# Patient Record
Sex: MALE | Race: BLACK OR AFRICAN AMERICAN | Employment: UNEMPLOYED | URBAN - METROPOLITAN AREA
[De-identification: names, ages, dates, MRNs, and addresses within clinical notes are randomized per-mention and may not be internally consistent; named-entity substitution may affect disease eponyms.]

---

## 2022-09-13 ENCOUNTER — OFFICE VISIT (OUTPATIENT)
Dept: INTERNAL MEDICINE CLINIC | Age: 57
End: 2022-09-13
Payer: COMMERCIAL

## 2022-09-13 VITALS
SYSTOLIC BLOOD PRESSURE: 182 MMHG | OXYGEN SATURATION: 100 % | TEMPERATURE: 97.9 F | DIASTOLIC BLOOD PRESSURE: 119 MMHG | BODY MASS INDEX: 40.43 KG/M2 | HEART RATE: 95 BPM | RESPIRATION RATE: 17 BRPM | HEIGHT: 74 IN | WEIGHT: 315 LBS

## 2022-09-13 DIAGNOSIS — Z76.89 ENCOUNTER TO ESTABLISH CARE: ICD-10-CM

## 2022-09-13 DIAGNOSIS — R40.0 HAS DAYTIME DROWSINESS: ICD-10-CM

## 2022-09-13 DIAGNOSIS — M54.31 RIGHT SIDED SCIATICA: ICD-10-CM

## 2022-09-13 DIAGNOSIS — Z13.21 SCREENING FOR ENDOCRINE, NUTRITIONAL, METABOLIC AND IMMUNITY DISORDER: ICD-10-CM

## 2022-09-13 DIAGNOSIS — R06.83 SNORING: ICD-10-CM

## 2022-09-13 DIAGNOSIS — Z13.0 SCREENING FOR ENDOCRINE, NUTRITIONAL, METABOLIC AND IMMUNITY DISORDER: ICD-10-CM

## 2022-09-13 DIAGNOSIS — Z23 NEEDS FLU SHOT: ICD-10-CM

## 2022-09-13 DIAGNOSIS — Z13.220 SCREENING FOR LIPID DISORDERS: ICD-10-CM

## 2022-09-13 DIAGNOSIS — Z11.59 NEED FOR HEPATITIS C SCREENING TEST: ICD-10-CM

## 2022-09-13 DIAGNOSIS — E66.01 SEVERE OBESITY (HCC): ICD-10-CM

## 2022-09-13 DIAGNOSIS — J45.40 MODERATE PERSISTENT REACTIVE AIRWAY DISEASE WITHOUT COMPLICATION: ICD-10-CM

## 2022-09-13 DIAGNOSIS — Z11.4 SCREENING FOR HIV WITHOUT PRESENCE OF RISK FACTORS: ICD-10-CM

## 2022-09-13 DIAGNOSIS — Z13.29 SCREENING FOR ENDOCRINE, NUTRITIONAL, METABOLIC AND IMMUNITY DISORDER: ICD-10-CM

## 2022-09-13 DIAGNOSIS — R06.09 DYSPNEA ON EXERTION: ICD-10-CM

## 2022-09-13 DIAGNOSIS — I10 PRIMARY HYPERTENSION: Primary | ICD-10-CM

## 2022-09-13 DIAGNOSIS — Z13.228 SCREENING FOR ENDOCRINE, NUTRITIONAL, METABOLIC AND IMMUNITY DISORDER: ICD-10-CM

## 2022-09-13 PROCEDURE — 99204 OFFICE O/P NEW MOD 45 MIN: CPT | Performed by: NURSE PRACTITIONER

## 2022-09-13 PROCEDURE — 90686 IIV4 VACC NO PRSV 0.5 ML IM: CPT | Performed by: INTERNAL MEDICINE

## 2022-09-13 RX ORDER — AMLODIPINE BESYLATE 10 MG/1
10 TABLET ORAL DAILY
Qty: 90 TABLET | Refills: 3 | Status: SHIPPED | OUTPATIENT
Start: 2022-09-13

## 2022-09-13 RX ORDER — OLMESARTAN MEDOXOMIL 40 MG/1
40 TABLET ORAL DAILY
Qty: 90 TABLET | Refills: 3 | Status: SHIPPED | OUTPATIENT
Start: 2022-09-13

## 2022-09-13 RX ORDER — CHLORTHALIDONE 25 MG/1
25 TABLET ORAL DAILY
Qty: 90 TABLET | Refills: 3 | Status: SHIPPED | OUTPATIENT
Start: 2022-09-13

## 2022-09-13 RX ORDER — POTASSIUM CHLORIDE 20 MEQ/1
20 TABLET, EXTENDED RELEASE ORAL DAILY
Qty: 90 TABLET | Refills: 3 | Status: SHIPPED | OUTPATIENT
Start: 2022-09-13

## 2022-09-13 RX ORDER — HYDROCHLOROTHIAZIDE 25 MG/1
25 TABLET ORAL DAILY
Qty: 30 TABLET | Refills: 11 | Status: CANCELLED | OUTPATIENT
Start: 2022-09-13

## 2022-09-13 RX ORDER — ALBUTEROL SULFATE 90 UG/1
2 AEROSOL, METERED RESPIRATORY (INHALATION)
Qty: 1 EACH | Refills: 1 | Status: SHIPPED | OUTPATIENT
Start: 2022-09-13

## 2022-09-13 NOTE — PATIENT INSTRUCTIONS
Vaccine Information Statement    Influenza (Flu) Vaccine (Inactivated or Recombinant): What You Need to Know    Many vaccine information statements are available in Chinese and other languages. See www.immunize.org/vis. Hojas de información sobre vacunas están disponibles en español y en muchos otros idiomas. Visite www.immunize.org/vis. 1. Why get vaccinated? Influenza vaccine can prevent influenza (flu). Flu is a contagious disease that spreads around the [facility] every year, usually between October and May. Anyone can get the flu, but it is more dangerous for some people. Infants and young children, people 72 years and older, pregnant people, and people with certain health conditions or a weakened immune system are at greatest risk of flu complications. Pneumonia, bronchitis, sinus infections, and ear infections are examples of flu-related complications. If you have a medical condition, such as heart disease, cancer, or diabetes, flu can make it worse. Flu can cause fever and chills, sore throat, muscle aches, fatigue, cough, headache, and runny or stuffy nose. Some people may have vomiting and diarrhea, though this is more common in children than adults. In an average year, thousands of people in the [facility] die from flu, and many more are hospitalized. Flu vaccine prevents millions of illnesses and flu-related visits to the doctor each year. 2. Influenza vaccines     CDC recommends everyone 6 months and older get vaccinated every flu season. Children 6 months through 6years of age may need 2 doses during a single flu season. Everyone else needs only 1 dose each flu season. It takes about 2 weeks for protection to develop after vaccination. There are many flu viruses, and they are always changing. Each year a new flu vaccine is made to protect against the influenza viruses believed to be likely to cause disease in the upcoming flu season.  Even when the vaccine doesnt exactly match these viruses, it may still provide some protection. Influenza vaccine does not cause flu. Influenza vaccine may be given at the same time as other vaccines. 3. Talk with your health care provider    Tell your vaccination provider if the person getting the vaccine:  Has had an allergic reaction after a previous dose of influenza vaccine, or has any severe, life-threatening allergies   Has ever had Guillain-Barré Syndrome (also called GBS)    In some cases, your health care provider may decide to postpone influenza vaccination until a future visit. Influenza vaccine can be administered at any time during pregnancy. People who are or will be pregnant during influenza season should receive inactivated influenza vaccine. People with minor illnesses, such as a cold, may be vaccinated. People who are moderately or severely ill should usually wait until they recover before getting influenza vaccine. Your health care provider can give you more information. 4. Risks of a vaccine reaction    Soreness, redness, and swelling where the shot is given, fever, muscle aches, and headache can happen after influenza vaccination. There may be a very small increased risk of Guillain-Barré Syndrome (GBS) after inactivated influenza vaccine (the flu shot). iLdia Youssef children who get the flu shot along with pneumococcal vaccine (PCV13) and/or DTaP vaccine at the same time might be slightly more likely to have a seizure caused by fever. Tell your health care provider if a child who is getting flu vaccine has ever had a seizure. People sometimes faint after medical procedures, including vaccination. Tell your provider if you feel dizzy or have vision changes or ringing in the ears. As with any medicine, there is a very remote chance of a vaccine causing a severe allergic reaction, other serious injury, or death. 5. What if there is a serious problem?     An allergic reaction could occur after the vaccinated person leaves the clinic. If you see signs of a severe allergic reaction (hives, swelling of the face and throat, difficulty breathing, a fast heartbeat, dizziness, or weakness), call 9-1-1 and get the person to the nearest hospital.    For other signs that concern you, call your health care provider. Adverse reactions should be reported to the Vaccine Adverse Event Reporting System (VAERS). Your health care provider will usually file this report, or you can do it yourself. Visit the VAERS website at www.vaers. [facility].gov or call 1-421.993.3415. VAERS is only for reporting reactions, and VAERS staff members do not give medical advice. 6. The National Vaccine Injury Compensation Program    The [facility] Vaccine Injury Compensation Program (VICP) is a federal program that was created to compensate people who may have been injured by certain vaccines. Claims regarding alleged injury or death due to vaccination have a time limit for filing, which may be as short as two years. Visit the VICP website at www.[facility].gov/vaccinecompensation or call 3-767.476.4475 to learn about the program and about filing a claim. 7. How can I learn more? Ask your health care provider. Call your local or state health department. Visit the website of the Food and Drug Administration (FDA) for vaccine package inserts and additional information at www.fda.gov/vaccines-blood-biologics/vaccines. Contact the Centers for Disease Control and Prevention (CDC): Call 0-821.328.7238 (1-800-CDC-INFO) or  Visit CDCs influenza website at www.cdc.gov/flu. Vaccine Information Statement   Inactivated Influenza Vaccine   8/6/2021  42 TYRONE Ocampo [id number]-[id number]   Department of Health and Human Services  Centers for Disease Control and Prevention    Office Use Only

## 2022-09-13 NOTE — PROGRESS NOTES
Pt is here for   Chief Complaint   Patient presents with    New Patient     Establishing care     Referral Request     For sleep study for possible sleep apnea     Hypertension     Needs medication     Chest Congestion     Pt states that he can feel the congestion in the chest. 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 States that whenever he has a ppd placement, he is always positive. But has never received any additional testing. 1. \"Have you been to the ER, urgent care clinic since your last visit? Hospitalized since your last visit? \" No    2. \"Have you seen or consulted any other health care providers outside of the [address] [place] [address] since your last visit? \" No     3. For patients aged 39-70: Has the patient had a colonoscopy / FIT/ Cologuard? No      If the patient is female:    4. For patients aged 41-77: Has the patient had a mammogram within the past 2 years? NA - based on age or sex      11. For patients aged 21-65: Has the patient had a pap smear? NA - based on age or sex      Mehnaz Avelar is a 65 y.o. male who presents for routine immunizations. He denies any symptoms , reactions or allergies that would exclude them from being immunized today. Risks and adverse reactions were discussed and the VIS was given to them. All questions were addressed. He was observed for 15 min post injection. There were no reactions observed. Maria C Camejo LPN

## 2022-09-13 NOTE — PROGRESS NOTES
Luz Leal (: 1965) is a 65 y.o. male, new patient, here for evaluation of the following chief complaint(s):  New Patient (Establishing care ), Referral Request (For sleep study for possible sleep apnea ), Hypertension (Needs medication ), and Chest Congestion (Pt states that he can feel the congestion in the chest. 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 States that whenever he has a ppd placement, he is always positive. But has never received any additional testing. )       ASSESSMENT/PLAN:  Below is the assessment and plan developed based on review of pertinent history, physical exam, labs, studies, and medications. 1. Primary hypertension  -     amLODIPine (Norvasc) 10 mg tablet; Take 1 Tablet by mouth daily. , Normal, Disp-90 Tablet, R-3  -     chlorthalidone (HYGROTON) 25 mg tablet; Take 1 Tablet by mouth daily. , Normal, Disp-90 Tablet, R-3  -     olmesartan (BENICAR) 40 mg tablet; Take 1 Tablet by mouth daily. , Normal, Disp-90 Tablet, R-3  2. Snoring  -     SLEEP MEDICINE REFERRAL  3. Has daytime drowsiness  -     SLEEP MEDICINE REFERRAL  4. Severe obesity ([address]  -     SLEEP MEDICINE REFERRAL  5. Dyspnea on exertion  -     PULMONARY FUNCTION TEST; Future  -     albuterol (PROVENTIL HFA, VENTOLIN HFA, PROAIR HFA) 90 mcg/actuation inhaler; Take 2 Puffs by inhalation every four (4) hours as needed for Wheezing or Shortness of Breath (persistent coughing). , Normal, Disp-1 Each, R-1  -     XR CHEST PA LAT; Future  6. Screening for lipid disorders  -     LIPID PANEL  7. Need for hepatitis C screening test  -     HCV RNA BY PCR W/REFL GENOTYPE  8. Screening for endocrine, nutritional, metabolic and immunity disorder  -     METABOLIC PANEL, COMPREHENSIVE  -     CBC WITH AUTOMATED DIFF  -     HEMOGLOBIN A1C WITH EAG  -     TSH 3RD GENERATION  9. Screening for HIV without presence of risk factors  -     HIV 1/2 AG/AB, 4TH GENERATION,W RFLX CONFIRM  10.  Moderate persistent reactive airway disease without complication  -     albuterol (PROVENTIL HFA, VENTOLIN HFA, PROAIR HFA) 90 mcg/actuation inhaler; Take 2 Puffs by inhalation every four (4) hours as needed for Wheezing or Shortness of Breath (persistent coughing). , Normal, Disp-1 Each, R-1  -     XR CHEST PA LAT; Future  11. Encounter to establish care  12. Right sided sciatica    Return in about 4 weeks (around 10/11/2022) for OV- HTN med change, lab/PFTs. Pt asked to complete follow by next visit: lose weight, increase physical activity, bring BP log to office visit, follow low salt diet, routine labs ordered, have labs drawn prior to ROV, call if any problems, changed ACEI to ARB, HCTZ changed to chlorthalidone, resumed amlodipine. Held clonidine. Referred to sleep med and PFTs ordered. SUBJECTIVE/OBJECTIVE:  HPI    Pt here to establish care. Seen and treated for HTN in the past. Would like new meds, felt he was on too many in the past. Out for months now. No home BP monitoring. Also reports gaining over 100 lbs with activity change and increased eating since returning home from residential in early 2021. Also has SOB, usually with activity, but also with productive cough and wheezing at times. No h/o smoking or asthma. Lastly with suspected sleep apnea. Associated with loud snoring, daytime drowsiness, and fatigue.      BP Readings from Last 3 Encounters:   09/13/22 (!) 182/119   12/13/13 (!) 150/110   05/03/13 138/76      Review of Systems  Constitutional: negative for fevers, chills, anorexia and weight loss  Respiratory:  negative for cough, hemoptysis, and wheezing  CV:   negative for chest pain, palpitations, and lower extremity edema  GI:   negative for nausea, vomiting, diarrhea, abdominal pain, and melena  Endo:               negative for polyuria,polydipsia,polyphagia, and heat intolerance  Genitourinary: negative for frequency, urgency, dysuria, retention, and hematuria  Integument:  negative for rash, ulcerations, and pruritus  Hematologic:  negative for easy bruising and bleeding  Musculoskel: negative for arthralgias, muscle weakness,and joint pain/swelling  Neurological:  negative for headaches, dizziness, vertigo,and memory/gait problems  Behavl/Psych: negative for feelings of anxiety, depression, suicide, and mood changes    Visit Vitals  BP (!) 182/119 (BP 1 Location: Left upper arm, BP Patient Position: Sitting, BP Cuff Size: Thigh)   Pulse 95   Temp 97.9 °F (36.6 °C) (Temporal)   Resp 17   Ht 6' 2\" (1.88 m)   Wt 344 lb (156 kg)   SpO2 100%   BMI 44.17 kg/m²       Wt Readings from Last 3 Encounters:   09/13/22 344 lb (156 kg)   12/13/13 257 lb 12.8 oz (116.9 kg)   05/03/13 291 lb (132 kg)         Physical Exam:   General appearance - alert, well appearing, and in no distress. Mental status - A/O x 4,normal mood and affect. Chest - CTA. Symmetric chest rise. No wheezing. mild distress. Audible breathing. Heart - Normal rate & rhythm. Normal S1 & S2. No MGR. Abdomen- Soft, round. Non-distended, NT. No pulsatile masses or hernias. Ext-  No clubbing or cyanosis. +BLE, 1+  Skin-Warm and dry. No hyperpigmentation, ulcerations, or suspicious lesions. Neuro - Normal speech, no focal findings or movement disorder. Normal strength, gait, and muscle tone. No results found for this or any previous visit. An electronic signature was used to authenticate this note.   -- Dinorah Miller NP

## 2022-09-14 ENCOUNTER — HOSPITAL ENCOUNTER (OUTPATIENT)
Dept: GENERAL RADIOLOGY | Age: 57
Discharge: HOME | End: 2022-09-14
Payer: COMMERCIAL

## 2022-09-14 ENCOUNTER — TELEPHONE (OUTPATIENT)
Dept: SLEEP MEDICINE | Age: 57
End: 2022-09-14

## 2022-09-14 DIAGNOSIS — J45.40 MODERATE PERSISTENT REACTIVE AIRWAY DISEASE WITHOUT COMPLICATION: ICD-10-CM

## 2022-09-14 DIAGNOSIS — R06.09 DYSPNEA ON EXERTION: ICD-10-CM

## 2022-09-14 PROCEDURE — 71046 X-RAY EXAM CHEST 2 VIEWS: CPT

## 2022-09-15 LAB
ALBUMIN SERPL-MCNC: 4.5 G/DL (ref 3.8–4.9)
ALBUMIN/GLOB SERPL: 1.5 {RATIO} (ref 1.2–2.2)
ALP SERPL-CCNC: 113 IU/L (ref 44–121)
ALT SERPL-CCNC: 9 IU/L (ref 0–44)
AST SERPL-CCNC: 15 IU/L (ref 0–40)
BASOPHILS # BLD AUTO: 0 X10E3/UL (ref 0–0.2)
BASOPHILS NFR BLD AUTO: 0 %
BILIRUB SERPL-MCNC: 0.5 MG/DL (ref 0–1.2)
BUN SERPL-MCNC: 21 MG/DL (ref 6–24)
BUN/CREAT SERPL: 14 (ref 9–20)
CALCIUM SERPL-MCNC: 9.9 MG/DL (ref 8.7–10.2)
CHLORIDE SERPL-SCNC: 98 MMOL/L (ref 96–106)
CHOLEST SERPL-MCNC: 228 MG/DL (ref 100–199)
CO2 SERPL-SCNC: 27 MMOL/L (ref 20–29)
CREAT SERPL-MCNC: 1.48 MG/DL (ref 0.76–1.27)
EGFR: 55 ML/MIN/1.73
EOSINOPHIL # BLD AUTO: 0.1 X10E3/UL (ref 0–0.4)
EOSINOPHIL NFR BLD AUTO: 1 %
ERYTHROCYTE [DISTWIDTH] IN BLOOD BY AUTOMATED COUNT: 15 % (ref 11.6–15.4)
EST. AVERAGE GLUCOSE BLD GHB EST-MCNC: 137 MG/DL
GLOBULIN SER CALC-MCNC: 3 G/DL (ref 1.5–4.5)
GLUCOSE SERPL-MCNC: 98 MG/DL (ref 65–99)
HBA1C MFR BLD: 6.4 % (ref 4.8–5.6)
HCT VFR BLD AUTO: 40 % (ref 37.5–51)
HCV GENTYP SERPL NAA+PROBE: NORMAL
HCV RNA SERPL NAA+PROBE-ACNC: NORMAL IU/ML
HCV RNA SERPL NAA+PROBE-LOG IU: NORMAL LOG10 IU/ML
HDLC SERPL-MCNC: 51 MG/DL
HGB BLD-MCNC: 12.8 G/DL (ref 13–17.7)
HIV 1+2 AB+HIV1 P24 AG SERPL QL IA: NON REACTIVE
IMM GRANULOCYTES # BLD AUTO: 0 X10E3/UL (ref 0–0.1)
IMM GRANULOCYTES NFR BLD AUTO: 0 %
IMP & REVIEW OF LAB RESULTS: NORMAL
INTERPRETATION: NORMAL
LDLC SERPL CALC-MCNC: 162 MG/DL (ref 0–99)
LYMPHOCYTES # BLD AUTO: 1.4 X10E3/UL (ref 0.7–3.1)
LYMPHOCYTES NFR BLD AUTO: 18 %
MCH RBC QN AUTO: 26.1 PG (ref 26.6–33)
MCHC RBC AUTO-ENTMCNC: 32 G/DL (ref 31.5–35.7)
MCV RBC AUTO: 82 FL (ref 79–97)
MONOCYTES # BLD AUTO: 0.8 X10E3/UL (ref 0.1–0.9)
MONOCYTES NFR BLD AUTO: 9 %
NEUTROPHILS # BLD AUTO: 5.7 X10E3/UL (ref 1.4–7)
NEUTROPHILS NFR BLD AUTO: 72 %
PLATELET # BLD AUTO: 266 X10E3/UL (ref 150–450)
POTASSIUM SERPL-SCNC: 3.1 MMOL/L (ref 3.5–5.2)
PROT SERPL-MCNC: 7.5 G/DL (ref 6–8.5)
RBC # BLD AUTO: 4.9 X10E6/UL (ref 4.14–5.8)
SODIUM SERPL-SCNC: 140 MMOL/L (ref 134–144)
TEST INFORMATION: NORMAL
TRIGL SERPL-MCNC: 83 MG/DL (ref 0–149)
TSH SERPL DL<=0.005 MIU/L-ACNC: 4.81 UIU/ML (ref 0.45–4.5)
VLDLC SERPL CALC-MCNC: 15 MG/DL (ref 5–40)
WBC # BLD AUTO: 8 X10E3/UL (ref 3.4–10.8)

## 2022-09-16 DIAGNOSIS — I10 PRIMARY HYPERTENSION: ICD-10-CM

## 2022-09-16 DIAGNOSIS — E78.2 MIXED HYPERLIPIDEMIA: ICD-10-CM

## 2022-09-16 DIAGNOSIS — E66.01 SEVERE OBESITY (HCC): ICD-10-CM

## 2022-09-16 DIAGNOSIS — E88.81 METABOLIC SYNDROME: ICD-10-CM

## 2022-09-16 DIAGNOSIS — R73.03 PREDIABETES: Primary | ICD-10-CM

## 2022-09-16 PROBLEM — E88.810 METABOLIC SYNDROME: Status: ACTIVE | Noted: 2022-09-16

## 2022-09-16 RX ORDER — METFORMIN HYDROCHLORIDE 500 MG/1
500 TABLET ORAL 2 TIMES DAILY WITH MEALS
Qty: 180 TABLET | Refills: 3 | Status: SHIPPED | OUTPATIENT
Start: 2022-09-16

## 2022-09-16 RX ORDER — ATORVASTATIN CALCIUM 20 MG/1
20 TABLET, FILM COATED ORAL
Qty: 90 TABLET | Refills: 3 | Status: SHIPPED | OUTPATIENT
Start: 2022-09-16

## 2022-09-22 ENCOUNTER — HOSPITAL ENCOUNTER (OUTPATIENT)
Dept: PULMONOLOGY | Age: 57
Discharge: HOME | End: 2022-09-22
Attending: NURSE PRACTITIONER
Payer: COMMERCIAL

## 2022-09-22 DIAGNOSIS — R06.09 DYSPNEA ON EXERTION: ICD-10-CM

## 2022-09-22 PROCEDURE — 94010 BREATHING CAPACITY TEST: CPT

## 2022-10-17 ENCOUNTER — VIRTUAL VISIT (OUTPATIENT)
Dept: INTERNAL MEDICINE CLINIC | Age: 57
End: 2022-10-17
Payer: COMMERCIAL

## 2022-10-17 DIAGNOSIS — I10 PRIMARY HYPERTENSION: ICD-10-CM

## 2022-10-17 DIAGNOSIS — D50.8 IRON DEFICIENCY ANEMIA SECONDARY TO INADEQUATE DIETARY IRON INTAKE: Primary | ICD-10-CM

## 2022-10-17 DIAGNOSIS — E87.6 DIURETIC-INDUCED HYPOKALEMIA: ICD-10-CM

## 2022-10-17 DIAGNOSIS — R79.89 ELEVATED TSH: ICD-10-CM

## 2022-10-17 DIAGNOSIS — T50.2X5A DIURETIC-INDUCED HYPOKALEMIA: ICD-10-CM

## 2022-10-17 DIAGNOSIS — E66.01 SEVERE OBESITY (HCC): ICD-10-CM

## 2022-10-17 DIAGNOSIS — E78.2 MIXED HYPERLIPIDEMIA: ICD-10-CM

## 2022-10-17 DIAGNOSIS — E88.81 METABOLIC SYNDROME: ICD-10-CM

## 2022-10-17 DIAGNOSIS — N28.9 RENAL IMPAIRMENT: ICD-10-CM

## 2022-10-17 DIAGNOSIS — R73.03 PREDIABETES: ICD-10-CM

## 2022-10-17 PROCEDURE — 99214 OFFICE O/P EST MOD 30 MIN: CPT | Performed by: NURSE PRACTITIONER

## 2022-10-17 NOTE — PROGRESS NOTES
Vita Leal is a 65 y.o. male established patient, here for evaluation of the following chief complaint(s):   Follow-up (HTN med change, labs/PFTs)          Assessment & Plan:   Diagnoses and all orders for this visit:    1. Iron deficiency anemia secondary to inadequate dietary iron intake  -     HGB & HCT; Future    2. Renal impairment  -     RENAL FUNCTION PANEL    3. Elevated TSH  -     TSH 3RD GENERATION; Future  -     T4 (THYROXINE); Future    4. Severe obesity ([address]  -     RENAL FUNCTION PANEL  -     HGB & HCT; Future  -     TSH 3RD GENERATION; Future  -     T4 (THYROXINE); Future    5. Mixed hyperlipidemia    6. Metabolic syndrome    7. Prediabetes    8. Primary hypertension    9. Diuretic-induced hypokalemia              Follow-up and Dispositions    Return in about 2 months (around 12/17/2022) for OV- Repeat labs A1C for preDM, CHOL, BMI, HTN. Specific pt instructions until next visit: lose weight, increase physical activity, follow low salt diet, continue present plan, routine labs ordered, have labs drawn prior to ROV, call if any problems    Subjective:   Vita Leal is a 65 y.o. male who was seen for Follow-up (HTN med change, labs/PFTs)      Pt presents to f/u HTN and review labs. Has already reviewed labs online/letter, has no questions regarding the results or recommendations. Home BP readings running 120/80's. Taking meds as prescribed. Denies side effects from medication. Feels good. No acute complaints otherwise.   BP Readings from Last 3 Encounters:   09/13/22 (!) 182/119   12/13/13 (!) 150/110   05/03/13 138/76     Lab Results   Component Value Date/Time    Cholesterol, total 228 (H) 09/14/2022 08:49 AM    HDL Cholesterol 51 09/14/2022 08:49 AM    LDL, calculated 162 (H) 09/14/2022 08:49 AM    VLDL, calculated 15 09/14/2022 08:49 AM    Triglyceride 83 09/14/2022 08:49 AM     Lab Results   Component Value Date/Time    Hemoglobin A1c 6.4 (H) 09/14/2022 08:49 AM     Lab Results Component Value Date/Time    TSH 4.810 (H) 09/14/2022 08:49 AM     Lab Results   Component Value Date/Time    Sodium 140 09/14/2022 08:49 AM    Potassium 3.1 (L) 09/14/2022 08:49 AM    Chloride 98 09/14/2022 08:49 AM    CO2 27 09/14/2022 08:49 AM    Glucose 98 09/14/2022 08:49 AM    BUN 21 09/14/2022 08:49 AM    Creatinine 1.48 (H) 09/14/2022 08:49 AM    BUN/Creatinine ratio 14 09/14/2022 08:49 AM    Calcium 9.9 09/14/2022 08:49 AM    Bilirubin, total 0.5 09/14/2022 08:49 AM    Alk. phosphatase 113 09/14/2022 08:49 AM    Protein, total 7.5 09/14/2022 08:49 AM    Albumin 4.5 09/14/2022 08:49 AM    A-G Ratio 1.5 09/14/2022 08:49 AM    ALT (SGPT) 9 09/14/2022 08:49 AM    AST (SGOT) 15 09/14/2022 08:49 AM     Lab Results   Component Value Date/Time    WBC 8.0 09/14/2022 08:49 AM    HGB 12.8 (L) 09/14/2022 08:49 AM    HCT 40.0 09/14/2022 08:49 AM    PLATELET 133 70/54/6109 08:49 AM    MCV 82 09/14/2022 08:49 AM           Patient Active Problem List    Diagnosis Date Noted    Diuretic-induced hypokalemia 10/17/2022    Elevated TSH 10/17/2022    Renal impairment 10/17/2022    Prediabetes 07/79/1522    Metabolic syndrome 85/76/5029    Mixed hyperlipidemia 09/16/2022    Right sided sciatica 09/13/2022    Dyspnea on exertion 09/13/2022    Severe obesity ([address] 09/13/2022    Snoring 09/13/2022    Moderate persistent reactive airway disease without complication 91/30/2083    HTN (hypertension) 03/11/2013     Current Outpatient Medications   Medication Sig Dispense Refill    metFORMIN (GLUCOPHAGE) 500 mg tablet Take 1 Tablet by mouth two (2) times daily (with meals). Indications: type 2 diabetes mellitus 180 Tablet 3    atorvastatin (LIPITOR) 20 mg tablet Take 1 Tablet by mouth nightly. Indications: high cholesterol and high triglycerides 90 Tablet 3    amLODIPine (Norvasc) 10 mg tablet Take 1 Tablet by mouth daily. 90 Tablet 3    chlorthalidone (HYGROTON) 25 mg tablet Take 1 Tablet by mouth daily.  90 Tablet 3    olmesartan (BENICAR) 40 mg tablet Take 1 Tablet by mouth daily. 90 Tablet 3    albuterol (PROVENTIL HFA, VENTOLIN HFA, PROAIR HFA) 90 mcg/actuation inhaler Take 2 Puffs by inhalation every four (4) hours as needed for Wheezing or Shortness of Breath (persistent coughing). 1 Each 1    potassium chloride (K-DUR, KLOR-CON M20) 20 mEq tablet Take 1 Tablet by mouth daily. With diuretic (Hydrochlorthiazide or Lasix)  Indications: prevention of low potassium in the blood 90 Tablet 3     No Known Allergies  Past Medical History:   Diagnosis Date    HTN (hypertension)     Hypokalemia      Past Surgical History:   Procedure Laterality Date    HX PREMALIG/BENIGN SKIN LESION EXCISION      cyst removal on scalp       Review of Systems   Constitutional: Negative for fever and malaise/fatigue. Eyes: Negative for blurred vision. Respiratory: Negative for cough and shortness of breath. Cardiovascular: Negative for chest pain and leg swelling. Neurological: Negative for dizziness, weakness and headaches. Objective:   Vital Signs: (As obtained by patient/caregiver at home)  There were no vitals taken for this visit. Physical Exam:  General appearance - alert, well  appearing, and in no distress. Mental status - A/O x 4,normal mood and affect. Eyes- no periorbital edema, drainage, or irritation noted. Nose- no obvious drainage or swelling. Throat- no obvious swelling, goiter, or notable lymphadenopathy  Chest - Symmetric chest rise. No wheezing or coughing. No distress. Skin- normal skin tone noted. No hyperpigmentation or obvious deformities. No diaphoresis noted. No flushing. Neuro - Normal speech, no focal findings or movement disorder. Other pertinent observable physical exam findings:-        We discussed the expected course, resolution and complications of the diagnosis(es) in detail. Medication risks, benefits, costs, interactions, and alternatives were discussed as indicated.   I advised him to contact the office if his condition worsens, changes or fails to improve as anticipated. He expressed understanding with the diagnosis(es) and plan. Khurram Leal, was evaluated through a synchronous (real-time) audio-video encounter. The patient (or guardian if applicable) is aware that this is a billable service, which includes applicable co-pays. This Virtual Visit was conducted with patient's (and/or legal guardian's) consent. The visit was conducted pursuant to the emergency declaration under the [address] [place] [address], [address] [place] [address] authority and the [employer] General Act. Patient identification was verified, and a caregiver was present when appropriate. The patient was located at: Home: [address] [facility]  The provider was located at: Home: [unfilled]   in [facility], [address] [place] [address] [employer] was used to authenticate this note.   -- Jacques Russo NP

## 2022-10-17 NOTE — PROGRESS NOTES
Pt is here for   Chief Complaint   Patient presents with    Follow-up     HTN med change, labs/PFTs     1. Have you been to the ER, urgent care clinic since your last visit? Hospitalized since your last visit? No    2. Have you seen or consulted any other health care providers outside of the [address] [place] [address] since your last visit? Include any pap smears or colon screening.  No

## 2022-10-27 ENCOUNTER — DOCUMENTATION ONLY (OUTPATIENT)
Dept: SLEEP MEDICINE | Age: 57
End: 2022-10-27

## 2022-10-27 ENCOUNTER — OFFICE VISIT (OUTPATIENT)
Dept: SLEEP MEDICINE | Age: 57
End: 2022-10-27
Payer: COMMERCIAL

## 2022-10-27 VITALS
DIASTOLIC BLOOD PRESSURE: 108 MMHG | OXYGEN SATURATION: 97 % | HEART RATE: 90 BPM | WEIGHT: 315 LBS | SYSTOLIC BLOOD PRESSURE: 162 MMHG | HEIGHT: 74 IN | BODY MASS INDEX: 40.43 KG/M2

## 2022-10-27 DIAGNOSIS — E66.01 SEVERE OBESITY (BMI >= 40) (HCC): ICD-10-CM

## 2022-10-27 DIAGNOSIS — I10 PRIMARY HYPERTENSION: ICD-10-CM

## 2022-10-27 DIAGNOSIS — G47.33 OBSTRUCTIVE SLEEP APNEA (ADULT) (PEDIATRIC): Primary | ICD-10-CM

## 2022-10-27 PROCEDURE — 3075F SYST BP GE 130 - 139MM HG: CPT | Performed by: INTERNAL MEDICINE

## 2022-10-27 PROCEDURE — 3078F DIAST BP <80 MM HG: CPT | Performed by: INTERNAL MEDICINE

## 2022-10-27 PROCEDURE — 99204 OFFICE O/P NEW MOD 45 MIN: CPT | Performed by: INTERNAL MEDICINE

## 2022-10-27 NOTE — PATIENT INSTRUCTIONS
[address] [facility] [place], [address]  Tel.  [phone]  Fax. [facility]  [place], [address] [facility]  Tel.  [phone]  Fax. [phone] [address]  Tel.  [phone]  Fax. [phone]     Sleep Apnea: After Your Visit  Your Care Instructions  Sleep apnea occurs when you frequently stop breathing for 10 seconds or longer during sleep. It can be mild to severe, based on the number of times per hour that you stop breathing or have slowed breathing. Blocked or narrowed airways in your nose, mouth, or throat can cause sleep apnea. Your airway can become blocked when your throat muscles and tongue relax during sleep. Sleep apnea is common, occurring in 1 out of 20 individuals. Individuals having any of the following characteristics should be evaluated and treated right away due to high risk and detrimental consequences from untreated sleep apnea:  Obesity  Congestive Heart failure  Atrial Fibrillation  Uncontrolled Hypertension  Type II Diabetes  Night-time Arrhythmias  Stroke  Pulmonary Hypertension  High-risk Driving Populations (pilots, truck drivers, etc.)  Patients Considering Weight-loss Surgery    How do you know you have sleep apnea? You probably have sleep apnea if you answer 'yes' to 3 or more of the following questions:  S - Have you been told that you Snore? T - Are you often Tired during the day? O - Has anyone Observed you stop breathing while sleeping? P- Do you have (or are being treated for) high blood Pressure? B - Are you obese (Body Mass Index > 35)? A - Is your Age 48years old or older? N - Is your Neck size greater than 16 inches? G - Are you male Gender? A sleep physician can prescribe a breathing device that prevents tissues in the throat from blocking your airway. Or your doctor may recommend using a dental device (oral breathing device) to help keep your airway open.  In some cases, surgery may be needed to remove enlarged tissues in the throat. Follow-up care is a key part of your treatment and safety. Be sure to make and go to all appointments, and call your doctor if you are having problems. It's also a good idea to know your test results and keep a list of the medicines you take. How can you care for yourself at home? Lose weight, if needed. It may reduce the number of times you stop breathing or have slowed breathing. Go to bed at the same time every night. Sleep on your side. It may stop mild apnea. If you tend to roll onto your back, sew a pocket in the back of your pa[employer] top. Put a tennis ball into the pocket, and stitch the pocket shut. This will help keep you from sleeping on your back. Avoid alcohol and medicines such as sleeping pills and sedatives before bed. Do not smoke. Smoking can make sleep apnea worse. If you need help quitting, talk to your doctor about stop-smoking programs and medicines. These can increase your chances of quitting for good. Prop up the head of your bed 4 to 6 inches by putting bricks under the legs of the bed. Treat breathing problems, such as a stuffy nose, caused by a cold or allergies. Use a continuous positive airway pressure (CPAP) breathing machine if lifestyle changes do not help your apnea and your doctor recommends it. The machine keeps your airway from closing when you sleep. If CPAP does not help you, ask your doctor whether you should try other breathing machines. A bilevel positive airway pressure machine has two types of air pressureâone for breathing in and one for breathing out. Another device raises or lowers air pressure as needed while you breathe. If your nose feels dry or bleeds when using one of these machines, talk with your doctor about increasing moisture in the air. A humidifier may help.   If your nose is runny or stuffy from using a breathing machine, talk with your doctor about using decongestants or a corticosteroid nasal spray.  When should you call for help? Watch closely for changes in your health, and be sure to contact your doctor if:  You still have sleep apnea even though you have made lifestyle changes. You are thinking of trying a device such as CPAP. You are having problems using a CPAP or similar machine. Where can you learn more? Go to [employer]. Enter W528 in the search box to learn more about \"Sleep Apnea: After Your Visit. \"   © 3466-7122 Healthwise, Incorporated. Care instructions adapted under license by [employer] (which disclaims liability or warranty for this information). This care instruction is for use with your licensed healthcare professional. If you have questions about a medical condition or this instruction, always ask your healthcare professional. Karen Sours any warranty or liability for your use of this information. PROPER SLEEP HYGIENE    What to avoid  Do not have drinks with caffeine, such as coffee or black tea, for 8 hours before bed. Do not smoke or use other types of tobacco near bedtime. Nicotine is a stimulant and can keep you awake. Avoid drinking alcohol late in the evening, because it can cause you to wake in the middle of the night. Do not eat a big meal close to bedtime. If you are hungry, eat a light snack. Do not drink a lot of water close to bedtime, because the need to urinate may wake you up during the night. Do not read or watch TV in bed. Use the bed only for sleeping and sexual activity. What to try  Go to bed at the same time every night, and wake up at the same time every morning. Do not take naps during the day. Keep your bedroom quiet, dark, and cool. Get regular exercise, but not within 3 to 4 hours of your bedtime. .  Sleep on a comfortable pillow and mattress. If watching the clock makes you anxious, turn it facing away from you so you cannot see the time.   If you worry when you lie down, start a worry book. Well before bedtime, write down your worries, and then set the book and your concerns aside. Try meditation or other relaxation techniques before you go to bed. If you cannot fall asleep, get up and go to another room until you feel sleepy. Do something relaxing. Repeat your bedtime routine before you go to bed again. Make your house quiet and calm about an hour before bedtime. Turn down the lights, turn off the TV, log off the computer, and turn down the volume on music. This can help you relax after a busy day. Drowsy Driving  The [address] [place] [address] Road Traffic Safety Administration cites drowsiness as a causing factor in more than 295,068 police reported crashes annually, resulting in 76,000 injuries and 1,500 deaths. Other surveys suggest 55% of people polled have driven while drowsy in the past year, 23% had fallen asleep but not crashed, 3% crashed, and 2% had and accident due to drowsy driving. Who is at risk? Young Drivers: One study of drowsy driving accidents states that 55% of the drivers were under 25 years. Of those, 75% were male. Shift Workers and Travelers: People who work overnight or travel across time zones frequently are at higher risk of experiencing Circadian Rhythm Disorders. They are trying to work and function when their body is programed to sleep. Sleep Deprived: Lack of sleep has a serious impact on your ability to pay attention or focus on a task. Consistently getting less than the average of 8 hours your body needs creates partial or cumulative sleep deprivation. Untreated Sleep Disorders: Sleep Apnea, Narcolepsy, R.L.S., and other sleep disorders (untreated) prevent a person from getting enough restful sleep. This leads to excessive daytime sleepiness and increases the risk for drowsy driving accidents by up to 7 times. Medications / Alcohol: Even over the counter medications can cause drowsiness.  Medications that impair a drivers attention should have a warning label. Alcohol naturally makes you sleepy and on its own can cause accidents. Combined with excessive drowsiness its effects are amplified. Signs of Drowsy Driving:   * You don't remember driving the last few miles   * You may drift out of your jasbir   * You are unable to focus and your thoughts wander   * You may yawn more often than normal   * You have difficulty keeping your eyes open / nodding off   * Missing traffic signs, speeding, or tailgating  Prevention-   Good sleep hygiene, lifestyle and behavioral choices have the most impact on drowsy driving. There is no substitute for sleep and the average person requires 8 hours nightly. If you find yourself driving drowsy, stop and sleep. Consider the sleep hygiene tips provided during your visit as well. Medication Refill Policy: Refills for all medications require 1 week advance notice. Please have your pharmacy fax a refill request. We are unable to fax, or call in \"controled substance\" medications and you will need to pick these prescriptions up from our office. [employer] Activation    Thank you for requesting access to [employer]. Please follow the instructions below to securely access and download your online medical record. [employer] allows you to send messages to your doctor, view your test results, renew your prescriptions, schedule appointments, and more. How Do I Sign Up? In your internet browser, go to [email or web address]. Click on the First Time User? Click Here link in the Sign In box. You will see the New Member Sign Up page. Enter your [employer] Access Code exactly as it appears below. You will not need to use this code after youve completed the sign-up process. If you do not sign up before the expiration date, you must request a new code.     [employer] Access Code: [email or web address]  Expires: 10/28/2022  1:52 PM (This is the date your [employer] access code will )    Enter the last four digits of your Social Security Number (xxxx) and Date of Birth (mm/dd/yyyy) as indicated and click Submit. You will be taken to the next sign-up page. Create a [employer] ID. This will be your [employer] login ID and cannot be changed, so think of one that is secure and easy to remember. Create a [employer] password. You can change your password at any time. Enter your Password Reset Question and Answer. This can be used at a later time if you forget your password. Enter your e-mail address. You will receive e-mail notification when new information is available in [address] Click Sign Up. You can now view and download portions of your medical record. Click the [facility] link to download a portable copy of your medical information. Additional Information    If you have questions, please call [phone]. Remember, [employer] is NOT to be used for urgent needs. For medical emergencies, dial 911.

## 2022-10-27 NOTE — PROGRESS NOTES
[address] [place]. [place], [address]  Tel.  [phone]  Fax. 100 [facility]  [place], [address] [facility]  Tel.  [phone]  Fax. [phone] [address]   Tel.  [phone]  Fax. [phone]         Subjective:      Brett Leal is an 65 y.o. male referred for evaluation for a sleep disorder. He complains of snoring, periods of not breathing associated with excessive daytime sleepiness. Symptoms began over 15  years ago, gradually worsening since that time. He usually can fall asleep in a few minutes. Family or house members note snoring, periods of not breathing. He denies falling asleep while driving. Brett Leal does wake up frequently at night. He is bothered by waking up too early and left unable to get back to sleep. He actually sleeps about 4 hours at night and wakes up about 6 times during the night. He does not work shifts:  .   Sheryle Lorenzo indicates he does get too little sleep at night. His bedtime is 0500. He awakens at 1000. He does take naps. He takes   naps a week lasting 1, Hour(s). He has the following observed behaviors: Loud snoring, Light snoring, Pauses in breathing;  . Other remarks: waking with gasp or snort, hallucinations   (Thinks he hears something when waking at times-like someone at the door)  [place] Sleepiness Score: 14   which reflect moderate daytime drowsiness. No Known Allergies      Current Outpatient Medications:     metFORMIN (GLUCOPHAGE) 500 mg tablet, Take 1 Tablet by mouth two (2) times daily (with meals). Indications: type 2 diabetes mellitus, Disp: 180 Tablet, Rfl: 3    atorvastatin (LIPITOR) 20 mg tablet, Take 1 Tablet by mouth nightly. Indications: high cholesterol and high triglycerides, Disp: 90 Tablet, Rfl: 3    amLODIPine (Norvasc) 10 mg tablet, Take 1 Tablet by mouth daily. , Disp: 90 Tablet, Rfl: 3    chlorthalidone (HYGROTON) 25 mg tablet, Take 1 Tablet by mouth daily. , Disp: 90 Tablet, Rfl: 3    olmesartan (BENICAR) 40 mg tablet, Take 1 Tablet by mouth daily. , Disp: 90 Tablet, Rfl: 3    albuterol (PROVENTIL HFA, VENTOLIN HFA, PROAIR HFA) 90 mcg/actuation inhaler, Take 2 Puffs by inhalation every four (4) hours as needed for Wheezing or Shortness of Breath (persistent coughing). , Disp: 1 Each, Rfl: 1    potassium chloride (K-DUR, KLOR-CON M20) 20 mEq tablet, Take 1 Tablet by mouth daily. With diuretic (Hydrochlorthiazide or Lasix)  Indications: prevention of low potassium in the blood, Disp: 90 Tablet, Rfl: 3     He  has a past medical history of HTN (hypertension) and Hypokalemia. He  has a past surgical history that includes hx premalig/benign skin lesion excision. He family history includes Heart Disease in his father; Hypertension in his father and mother. He  reports that he has never smoked. He has been exposed to tobacco smoke. He has never used smokeless tobacco. He reports current alcohol use. He reports that he does not use drugs. Review of Systems:  Constitutional:  recent weight loss  Eyes:  No blurred vision. CVS:  No significant chest pain  Pulm:  No significant shortness of breath  GI:  No significant nausea or vomiting  :  + significant nocturia  Musculoskeletal:  + back and leg pain at night  Skin:  No significant rashes  Neuro:  No significant dizziness   Psych:  No active mood issues    Sleep Review of Systems: notable for some  difficulty falling asleep; +frequent awakenings at night;  regular dreaming noted; no nightmares ; no early morning headaches; no memory problems; no concentration issues; no history of any automobile or occupational accidents due to daytime drowsiness.       Objective:   Visit Vitals  BP (!) 162/108   Pulse 90   Ht 6' 2\" (1.88 m)   Wt 332 lb (150.6 kg)   SpO2 97%   BMI 42.63 kg/m²         General:   Not in acute distress   Eyes:  Anicteric sclerae, no obvious strabismus   Nose:  No obvious nasal septum deviation Oropharynx:   Class 3 oropharyngeal outlet, thick tongue base, enlarged and boggy uvula, low-lying soft palate, narrow tonsilo-pharyngeal pilars   Tonsils:   tonsils are present and normal   Neck:    ;  18 inches, midline trachea   Chest/Lungs:  Equal lung expansion, clear on auscultation    CVS:  Normal rate, regular rhythm; no JVD   Skin:  Warm to touch; no obvious rashes   Neuro:  No focal deficits ; no obvious tremor    Psych:  Normal affect,  normal countenance;          Assessment:       ICD-10-CM ICD-9-CM    1. Obstructive sleep apnea (adult) (pediatric)  G47.33 327.23 SLEEP STUDY UNATTENDED, 4 CHANNEL      2. Primary hypertension  I10 401.9       3. Severe obesity (BMI >= 40) ([facility])  E66.01 278.01             Plan:     * The patient currently has a High Risk for having sleep apnea. STOP-BANG score 8.  * HSaT was ordered for initial evaluation. Treatment options for sleep apnea were reviewed. he would like to proceed with a trial of PAP if found to have significant apnea. * He was provided information on sleep apnea including coresponding risk factors and the importance of proper treatment. * Counseling was provided regarding proper sleep hygiene and safe driving. 2. Hypertension - not optimally controlled on current regimen. He will take his medication when he gets home today. he will continue his current regimen. he will continue to monitor at home and with his PMD for further adjustments as needed. I have reviewed the relationship between hypertension as it relates to sleep-disordered breathing. 3. Obesity - weight has been going down. I have discussed the relationship of weight to obstructive sleep apnea. I have advised him to continue his weight loss plan. We discussed reducing portions and avoiding beverages with calories. Exercise can further assist with weight loss/maintenance and was recommended. he understands that weight loss can reduce severity of sleep apnea and snoring. The treatment plan was reviewed with the patient in detail . he understands that the lead technologist will be calling him  with the results or notifying of results via [address] [place] [address] and assisting with the next step in the treatment plan as outlined today during the consultation with me. All of his questions were addressed. Thank you for allowing us to participate in your patient's medical care. We'll keep you updated on these investigations.   Electronically signed by    Meliza Justice MD  Diplomate in Sleep Medicine  [facility]  10/27/2022

## 2022-10-27 NOTE — Clinical Note
Thank you for the referral.  I will keep you informed of his progress.  [address], [place]

## 2022-11-07 ENCOUNTER — TELEPHONE (OUTPATIENT)
Dept: SLEEP MEDICINE | Age: 57
End: 2022-11-07

## 2022-11-07 NOTE — TELEPHONE ENCOUNTER
Received fax stating \"Code Provided  not accepted by insurance carrier. Mague Harman \" and they cancelled our order. Left message with Misha requesting additional explanation as this seems odd. Also emailed securely, our [email or web address]. Pending her response.

## 2022-11-14 DIAGNOSIS — J45.40 MODERATE PERSISTENT REACTIVE AIRWAY DISEASE WITHOUT COMPLICATION: ICD-10-CM

## 2022-11-14 DIAGNOSIS — R06.09 DYSPNEA ON EXERTION: ICD-10-CM

## 2022-11-14 RX ORDER — ALBUTEROL SULFATE 90 UG/1
AEROSOL, METERED RESPIRATORY (INHALATION)
Qty: 6.7 EACH | Refills: 1 | Status: SHIPPED | OUTPATIENT
Start: 2022-11-14

## 2022-12-19 ENCOUNTER — OFFICE VISIT (OUTPATIENT)
Dept: INTERNAL MEDICINE CLINIC | Age: 57
End: 2022-12-19
Payer: COMMERCIAL

## 2022-12-19 VITALS
HEIGHT: 74 IN | TEMPERATURE: 96.8 F | DIASTOLIC BLOOD PRESSURE: 94 MMHG | HEART RATE: 86 BPM | SYSTOLIC BLOOD PRESSURE: 150 MMHG | BODY MASS INDEX: 40.43 KG/M2 | WEIGHT: 315 LBS | OXYGEN SATURATION: 100 % | RESPIRATION RATE: 19 BRPM

## 2022-12-19 DIAGNOSIS — E87.6 DIURETIC-INDUCED HYPOKALEMIA: ICD-10-CM

## 2022-12-19 DIAGNOSIS — E66.01 SEVERE OBESITY (HCC): ICD-10-CM

## 2022-12-19 DIAGNOSIS — E78.2 MIXED HYPERLIPIDEMIA: ICD-10-CM

## 2022-12-19 DIAGNOSIS — R73.03 PREDIABETES: ICD-10-CM

## 2022-12-19 DIAGNOSIS — R79.89 ELEVATED TSH: ICD-10-CM

## 2022-12-19 DIAGNOSIS — D50.8 IRON DEFICIENCY ANEMIA SECONDARY TO INADEQUATE DIETARY IRON INTAKE: ICD-10-CM

## 2022-12-19 DIAGNOSIS — N28.9 RENAL IMPAIRMENT: ICD-10-CM

## 2022-12-19 DIAGNOSIS — I10 PRIMARY HYPERTENSION: Primary | ICD-10-CM

## 2022-12-19 DIAGNOSIS — R06.09 DYSPNEA ON EXERTION: ICD-10-CM

## 2022-12-19 DIAGNOSIS — T50.2X5A DIURETIC-INDUCED HYPOKALEMIA: ICD-10-CM

## 2022-12-19 PROCEDURE — 99214 OFFICE O/P EST MOD 30 MIN: CPT | Performed by: NURSE PRACTITIONER

## 2022-12-19 RX ORDER — LABETALOL 100 MG/1
100 TABLET, FILM COATED ORAL 2 TIMES DAILY
Qty: 60 TABLET | Refills: 2 | Status: SHIPPED | OUTPATIENT
Start: 2022-12-19

## 2022-12-19 NOTE — PROGRESS NOTES
Argelia Leal (: 1965) is a 65 y.o. male, new patient, here for evaluation of the following chief complaint(s):  Follow-up (Repeat A1C for preDM, CHOL. BMI, HTN) and Panic Attack (Pt states while in the grocery store )       ASSESSMENT/PLAN:  Below is the assessment and plan developed based on review of pertinent history, physical exam, labs, studies, and medications. 1. Primary hypertension  -     METABOLIC PANEL, COMPREHENSIVE  -     CBC WITH AUTOMATED DIFF  2. Dyspnea on exertion  -     CBC WITH AUTOMATED DIFF  3. Iron deficiency anemia secondary to inadequate dietary iron intake  -     CBC WITH AUTOMATED DIFF  4. Renal impairment  -     METABOLIC PANEL, COMPREHENSIVE  5. Elevated TSH  -     TSH 3RD GENERATION  -     T4, FREE; Future  -     T3 TOTAL; Future  6. Severe obesity ([address]  7. Mixed hyperlipidemia  -     LIPID PANEL  8. Prediabetes  -     HEMOGLOBIN A1C WITH EAG  9. Diuretic-induced hypokalemia  -     METABOLIC PANEL, COMPREHENSIVE    Return in about 4 weeks (around 2023) for OV-4 WK HTN med change, lab review. Pt asked to complete follow by next visit: lose weight, increase physical activity, bring BP log to office visit, follow low salt diet, routine labs ordered, have labs drawn prior to ROV, call if any problems, changed  from amlodipine to BB for SE, likely r/t amlodipine. SUBJECTIVE/OBJECTIVE:  HPI    Pt presents to f/u HTN, preDM, CHOL and BMI. Taking meds as prescribed, but having erectile concern with one of his BP med. Denies side effects from medication. Feels better overall, but had panic attack while grocery shopping recently. Feels it is related to his history of incarceration, too many people in the store.  Home BP running \"low\", closer to 120/80's  BP Readings from Last 3 Encounters:   22 (!) 150/94   10/27/22 (!) 162/108   22 (!) 182/119      Review of Systems  Constitutional: negative for fevers, chills, anorexia and weight loss  Respiratory: negative for cough, hemoptysis, and wheezing  CV:   negative for chest pain, palpitations, and lower extremity edema  GI:   negative for nausea, vomiting, diarrhea, abdominal pain, and melena  Endo:               negative for polyuria,polydipsia,polyphagia, and heat intolerance  Genitourinary: negative for frequency, urgency, dysuria, retention, and hematuria  Integument:  negative for rash, ulcerations, and pruritus  Hematologic:  negative for easy bruising and bleeding  Musculoskel: negative for arthralgias, muscle weakness,and joint pain/swelling  Neurological:  negative for headaches, dizziness, vertigo,and memory/gait problems  Behavl/Psych: negative for feelings of anxiety, depression, suicide, and mood changes    Visit Vitals  BP (!) 150/94 (BP 1 Location: Right upper arm, BP Patient Position: Sitting, BP Cuff Size: Large adult long)   Pulse 86   Temp 96.8 °F (36 °C) (Temporal)   Resp 19   Ht 6' 2\" (1.88 m)   Wt 328 lb (148.8 kg)   SpO2 100%   BMI 42.11 kg/m²       Wt Readings from Last 3 Encounters:   12/19/22 328 lb (148.8 kg)   10/27/22 332 lb (150.6 kg)   09/13/22 344 lb (156 kg)         Physical Exam:   General appearance - alert, well appearing, and in no distress. Mental status - A/O x 4,normal mood and affect. Chest - CTA. Symmetric chest rise. No wheezing. no distress. Heart - Normal rate & rhythm. Normal S1 & S2. No MGR. Abdomen- Soft, round. Non-distended, NT. No pulsatile masses or hernias. Ext-  No clubbing or cyanosis. +BLE, NP.  Skin-Warm and dry. No hyperpigmentation, ulcerations, or suspicious lesions. Neuro - Normal speech, no focal findings or movement disorder. Normal strength, gait, and muscle tone.      Results for orders placed or performed in visit on 85/49/86   METABOLIC PANEL, COMPREHENSIVE   Result Value Ref Range    Glucose 98 65 - 99 mg/dL    BUN 21 6 - 24 mg/dL    Creatinine 1.48 (H) 0.76 - 1.27 mg/dL    eGFR 55 (L) >59 mL/min/1.73    BUN/Creatinine ratio 14 9 - 20 Sodium 140 134 - 144 mmol/L    Potassium 3.1 (L) 3.5 - 5.2 mmol/L    Chloride 98 96 - 106 mmol/L    CO2 27 20 - 29 mmol/L    Calcium 9.9 8.7 - 10.2 mg/dL    Protein, total 7.5 6.0 - 8.5 g/dL    Albumin 4.5 3.8 - 4.9 g/dL    GLOBULIN, TOTAL 3.0 1.5 - 4.5 g/dL    A-G Ratio 1.5 1.2 - 2.2    Bilirubin, total 0.5 0.0 - 1.2 mg/dL    Alk. phosphatase 113 44 - 121 IU/L    AST (SGOT) 15 0 - 40 IU/L    ALT (SGPT) 9 0 - 44 IU/L   CBC WITH AUTOMATED DIFF   Result Value Ref Range    WBC 8.0 3.4 - 10.8 x10E3/uL    RBC 4.90 4.14 - 5.80 x10E6/uL    HGB 12.8 (L) 13.0 - 17.7 g/dL    HCT 40.0 37.5 - 51.0 %    MCV 82 79 - 97 fL    MCH 26.1 (L) 26.6 - 33.0 pg    MCHC 32.0 31.5 - 35.7 g/dL    RDW 15.0 11.6 - 15.4 %    PLATELET 011 073 - 659 x10E3/uL    NEUTROPHILS 72 Not Estab. %    Lymphocytes 18 Not Estab. %    MONOCYTES 9 Not Estab. %    EOSINOPHILS 1 Not Estab. %    BASOPHILS 0 Not Estab. %    ABS. NEUTROPHILS 5.7 1.4 - 7.0 x10E3/uL    Abs Lymphocytes 1.4 0.7 - 3.1 x10E3/uL    ABS. MONOCYTES 0.8 0.1 - 0.9 x10E3/uL    ABS. EOSINOPHILS 0.1 0.0 - 0.4 x10E3/uL    ABS. BASOPHILS 0.0 0.0 - 0.2 x10E3/uL    IMMATURE GRANULOCYTES 0 Not Estab. %    ABS. IMM.  GRANS. 0.0 0.0 - 0.1 x10E3/uL   HEMOGLOBIN A1C WITH EAG   Result Value Ref Range    Hemoglobin A1c 6.4 (H) 4.8 - 5.6 %    Estimated average glucose 137 mg/dL   LIPID PANEL   Result Value Ref Range    Cholesterol, total 228 (H) 100 - 199 mg/dL    Triglyceride 83 0 - 149 mg/dL    HDL Cholesterol 51 >39 mg/dL    VLDL, calculated 15 5 - 40 mg/dL    LDL, calculated 162 (H) 0 - 99 mg/dL   HCV RNA BY PCR W/REFL GENOTYPE   Result Value Ref Range    Hepatitis C Quantitation HCV Not Detected IU/mL    HCV log10 CANCELED log10 IU/mL    Test information Comment     HCV Genotype CANCELED    TSH 3RD GENERATION   Result Value Ref Range    TSH 4.810 (H) 0.450 - 4.500 uIU/mL   HIV 1/2 AG/AB, 4TH GENERATION,W RFLX CONFIRM   Result Value Ref Range    HIV SCREEN 4TH GENERATION WRFX Non Reactive Non Reactive CVD REPORT   Result Value Ref Range    INTERPRETATION Note    CKD REPORT   Result Value Ref Range    Interpretation Note                An electronic signature was used to authenticate this note.   -- Janae Cat NP

## 2022-12-19 NOTE — PROGRESS NOTES
Pt is here for   Chief Complaint   Patient presents with    Follow-up     Repeat A1C for preDM, CHOL. BMI, HTN    Panic Attack     Pt states while in the grocery store      1. \"Have you been to the ER, urgent care clinic since your last visit? Hospitalized since your last visit? \" No    2. \"Have you seen or consulted any other health care providers outside of the [address] [place] [address] since your last visit? \" No     3. For patients aged 39-70: Has the patient had a colonoscopy / FIT/ Cologuard? No      If the patient is female:    4. For patients aged 41-77: Has the patient had a mammogram within the past 2 years? NA - based on age or sex      11. For patients aged 21-65: Has the patient had a pap smear? NA - based on age or sex    Denies pain at this time     BP Readings from Last 3 Encounters:   12/19/22 (!) 150/94   10/27/22 (!) 162/108   09/13/22 (!) 182/119     Wt Readings from Last 3 Encounters:   12/19/22 328 lb (148.8 kg)   10/27/22 332 lb (150.6 kg)   09/13/22 344 lb (156 kg)     Estimated body mass index is 42.11 kg/m² as calculated from the following:    Height as of this encounter: 6' 2\" (1.88 m). Weight as of this encounter: 328 lb (148.8 kg). Estimated body surface area is 2.79 meters squared as calculated from the following:    Height as of this encounter: 6' 2\" (1.88 m). Weight as of this encounter: 328 lb (148.8 kg).

## 2022-12-20 DIAGNOSIS — E11.9 TYPE 2 DIABETES MELLITUS WITHOUT COMPLICATION, WITHOUT LONG-TERM CURRENT USE OF INSULIN (HCC): Primary | ICD-10-CM

## 2022-12-20 DIAGNOSIS — E11.9 DIABETES MELLITUS, NEW ONSET (HCC): ICD-10-CM

## 2022-12-20 DIAGNOSIS — R73.03 PREDIABETES: ICD-10-CM

## 2022-12-20 PROBLEM — R73.09 ELEVATED HEMOGLOBIN A1C: Status: ACTIVE | Noted: 2022-09-16

## 2022-12-20 LAB
ALBUMIN SERPL-MCNC: 4.7 G/DL (ref 3.8–4.9)
ALBUMIN/GLOB SERPL: 1.7 {RATIO} (ref 1.2–2.2)
ALP SERPL-CCNC: 119 IU/L (ref 44–121)
ALT SERPL-CCNC: 10 IU/L (ref 0–44)
AST SERPL-CCNC: 14 IU/L (ref 0–40)
BASOPHILS # BLD AUTO: 0 X10E3/UL (ref 0–0.2)
BASOPHILS NFR BLD AUTO: 0 %
BILIRUB SERPL-MCNC: 0.3 MG/DL (ref 0–1.2)
BUN SERPL-MCNC: 21 MG/DL (ref 6–24)
BUN/CREAT SERPL: 15 (ref 9–20)
CALCIUM SERPL-MCNC: 9.3 MG/DL (ref 8.7–10.2)
CHLORIDE SERPL-SCNC: 97 MMOL/L (ref 96–106)
CHOLEST SERPL-MCNC: 188 MG/DL (ref 100–199)
CO2 SERPL-SCNC: 28 MMOL/L (ref 20–29)
CREAT SERPL-MCNC: 1.39 MG/DL (ref 0.76–1.27)
EGFR: 59 ML/MIN/1.73
EOSINOPHIL # BLD AUTO: 0.1 X10E3/UL (ref 0–0.4)
EOSINOPHIL NFR BLD AUTO: 1 %
ERYTHROCYTE [DISTWIDTH] IN BLOOD BY AUTOMATED COUNT: 13.9 % (ref 11.6–15.4)
EST. AVERAGE GLUCOSE BLD GHB EST-MCNC: 143 MG/DL
GLOBULIN SER CALC-MCNC: 2.8 G/DL (ref 1.5–4.5)
GLUCOSE SERPL-MCNC: 92 MG/DL (ref 70–99)
HBA1C MFR BLD: 6.6 % (ref 4.8–5.6)
HCT VFR BLD AUTO: 38.8 % (ref 37.5–51)
HDLC SERPL-MCNC: 46 MG/DL
HGB BLD-MCNC: 12.8 G/DL (ref 13–17.7)
IMM GRANULOCYTES # BLD AUTO: 0 X10E3/UL (ref 0–0.1)
IMM GRANULOCYTES NFR BLD AUTO: 0 %
IMP & REVIEW OF LAB RESULTS: NORMAL
LDLC SERPL CALC-MCNC: 124 MG/DL (ref 0–99)
LYMPHOCYTES # BLD AUTO: 1.6 X10E3/UL (ref 0.7–3.1)
LYMPHOCYTES NFR BLD AUTO: 18 %
MCH RBC QN AUTO: 26.4 PG (ref 26.6–33)
MCHC RBC AUTO-ENTMCNC: 33 G/DL (ref 31.5–35.7)
MCV RBC AUTO: 80 FL (ref 79–97)
MONOCYTES # BLD AUTO: 0.8 X10E3/UL (ref 0.1–0.9)
MONOCYTES NFR BLD AUTO: 8 %
NEUTROPHILS # BLD AUTO: 6.4 X10E3/UL (ref 1.4–7)
NEUTROPHILS NFR BLD AUTO: 73 %
PLATELET # BLD AUTO: 283 X10E3/UL (ref 150–450)
POTASSIUM SERPL-SCNC: 3.1 MMOL/L (ref 3.5–5.2)
PROT SERPL-MCNC: 7.5 G/DL (ref 6–8.5)
RBC # BLD AUTO: 4.84 X10E6/UL (ref 4.14–5.8)
SODIUM SERPL-SCNC: 140 MMOL/L (ref 134–144)
T3 SERPL-MCNC: 147 NG/DL (ref 71–180)
T4 FREE SERPL-MCNC: 1.28 NG/DL (ref 0.82–1.77)
TRIGL SERPL-MCNC: 98 MG/DL (ref 0–149)
TSH SERPL DL<=0.005 MIU/L-ACNC: 3.81 UIU/ML (ref 0.45–4.5)
VLDLC SERPL CALC-MCNC: 18 MG/DL (ref 5–40)
WBC # BLD AUTO: 8.9 X10E3/UL (ref 3.4–10.8)

## 2022-12-20 RX ORDER — IBUPROFEN 200 MG
CAPSULE ORAL
Qty: 200 STRIP | Refills: 3 | Status: SHIPPED | OUTPATIENT
Start: 2022-12-20

## 2022-12-20 RX ORDER — LANCETS
EACH MISCELLANEOUS
Qty: 1 EACH | Refills: 11 | Status: SHIPPED | OUTPATIENT
Start: 2022-12-20

## 2022-12-20 RX ORDER — INSULIN PUMP SYRINGE, 3 ML
EACH MISCELLANEOUS
Qty: 1 KIT | Refills: 0 | Status: SHIPPED | OUTPATIENT
Start: 2022-12-20

## 2022-12-20 RX ORDER — ISOPROPYL ALCOHOL 70 ML/100ML
SWAB TOPICAL
Qty: 200 PAD | Refills: 3 | Status: SHIPPED | OUTPATIENT
Start: 2022-12-20

## 2022-12-30 ENCOUNTER — DOCUMENTATION ONLY (OUTPATIENT)
Dept: SLEEP MEDICINE | Age: 57
End: 2022-12-30

## 2023-01-11 ENCOUNTER — CLINICAL SUPPORT (OUTPATIENT)
Dept: DIABETES SERVICES | Age: 58
End: 2023-01-11
Payer: COMMERCIAL

## 2023-01-11 DIAGNOSIS — E11.9 TYPE 2 DIABETES MELLITUS WITHOUT COMPLICATION, WITHOUT LONG-TERM CURRENT USE OF INSULIN (HCC): ICD-10-CM

## 2023-01-11 DIAGNOSIS — E11.9 DIABETES MELLITUS, NEW ONSET (HCC): ICD-10-CM

## 2023-01-11 PROCEDURE — G0108 DIAB MANAGE TRN  PER INDIV: HCPCS

## 2023-01-11 NOTE — PROGRESS NOTES
[facility]  Diabetes Self-Management Education & Support Program    Reason for Referral: DM 2  Referral Source: Bogdan Linares NP  Services requested: DSMES       ASSESSMENT    From my perspective, the participant would benefit from [facility] specifically related to reducing risks, healthy eating, monitoring, taking medications, physical activity, healthy coping, and problem solving. Will adapt DSMES program to build on participant's skills score, confidence score, and preparedness score as noted in the Diabetes Skills, Confidence, and Preparedness Index. During the program, we will focus on providing DSMES that specifically addresses participant's interest in reducing risks, healthy eating, monitoring, taking medications, physical activity, healthy coping, and problem solving, as shown by their reported readiness to change. The participant would be best served by attending weekly individual sessions. Diabetes Self-Management Education Follow-up Visit: January 25, 2023       Clinical Presentation  Delia Leal is a 65 y.o.  male referred for diabetes self-management education. Participant has Type 2 DM not on insulin for <1 year. Family history positive for diabetes. Patient reports not receiving DSMES services in the past. Most recent A1c value:   Lab Results   Component Value Date/Time    Hemoglobin A1c 6.6 (H) 12/19/2022 03:53 PM       Diabetes-related medications:  Current dosing:   Key Antihyperglycemic Medications               metFORMIN (GLUCOPHAGE) 500 mg tablet Take 1 Tablet by mouth two (2) times daily (with meals). Indications: type 2 diabetes mellitus            Blood Pressure Management  Key ACE/ARB Medications               olmesartan (BENICAR) 40 mg tablet Take 1 Tablet by mouth daily. Lipid Management  Key Antihyperlipidemia Meds               atorvastatin (LIPITOR) 20 mg tablet Take 1 Tablet by mouth nightly.  Indications: high cholesterol and high triglycerides            Clot Prevention  Key Anti-Platelet Anticoagulant Meds       The patient is on no antiplatelet meds or anticoagulants. Learning Assessment  Learning objectives Educator assessment (1/11/2023)   Diabetes Disease Process  The participant can   A) describe diabetes in basic terms;   B) state the type of diabetes they have; &   C) state accepted blood glucose targets. Healthy Eating  The participant can   A) identify carbohydrate foods; &   B) accurately read food labels. Being Active  The participant can  A) state the benefits of physical activity;  B) report their current PA practices;  C) identify PA they would consider incorporating in their lives; &  D) develop an implementation plan. Monitoring  The participant can  A) operate their blood glucose meter; &  B) describe how they log their blood glucoses to share with their provider. Taking Medications  The participant can  A) name their diabetes medications;  B) state the purpose and dose;  C) note side effects; &  D) describe proper storage, disposal & transport (if appropriate). Healthy Coping  The participant can    A) describe their response to diabetes diagnosis; B) describe their specific coping mechanisms;  C) identify supportive people and/or other resources that positively support their diabetes self-care and health. Reducing Risks  The participant can describe the preventive measures used by providers to promote health and prevent diabetes complications. Problem Solving  The participant can   A) identify signs, symptoms & treatment of hypoglycemia;    B) identify signs, symptoms & treatment of hyperglycemia;  C) describe their sick day plan; &  D) identify BG patterns to discuss with their provider.        No  Yes  No        No  No        Yes  Yes  Yes  Yes        Yes, will start today  No        No  No  Yes  Yes          Yes, end of the world  Yes, writes  Yes, father        No        No  No  No  No     Characteristics to Learning   Barriers to Learning      None     Favorite Ways to Learn   [] Lecture  [] Slides  [] Reading [] Video-Internet  [] Cassettes/CDs/MP3's  [] Interactive Small Groups [x] Other one on one discussion       Behavioral Assessment  Current self-care practices  Educator assessment (1/11/2023)   Healthy Eating   Current practices    24-hour Dietary Recall:  Breakfast: 2 poached eggs, 3 slices turkey ronqu[place], 1-2 slices toast, coffee with splenda  Lunch: skips  Dinner: spaghetti, greens or steak or 2 chicken thighs & greens  Snacks: grapes, apples  Beverages: water, coffee with splenda, sweet tea  Alcohol: [place] iced tea 1-2 drinks a month       Would benefit from [facility] related to Healthy Eating: Yes    Eats a carbohydrate controlled diet: No    Stage of change: Action      Being Active  Current practices  How many days during the past week have you performed physical activity where your heart beats faster and your breathing is harder than normal for 30 minutes or more? 3 day(s)    How many days in a typical week do you perform activity such as this?  3 day(s)     Would benefit from [facility] related to Being Active: Yes}      Exercises 150 minutes/week: No      Stage of change: Action     Monitoring  Current practices  Do you monitor your blood sugar? Not yet, picked up glucometer yesterday    How often do you monitor? 1x/day    Do you know your last A1c measurement? No    Do you know the meaning of the A1c? No     Would benefit from [facility] related to Monitoring: Yes      Uses BG readings to establish trends and understand BG patterns: No      Stage of change: Action       Taking Medication  Current practices  Do you understand what your diabetes medications do? No    How often do you miss doses of your diabetes medications? Misses evening dose 2 times a week    Can you afford your diabetes medications?  Yes   Would benefit from [facility] related to Taking Medication: Yes      Takes medications consistently to receive full benefit: No      Stage of change: Action       Healthy Coping   Current state  Diabetes Skills, Confidence and Preparedness Index: Total score: 5.4  Skills: 3.6  Confidence: 6.6  Preparedness: 6.7       Would benefit from DSMES related to Healthy Coping: Yes      Identifies specific people, organizations,etc, that actively support their diabetes self-care efforts: Yes, father      Stage of change: Action     Reducing Risks  Current state  Vaccines:  Influenza: Yes  Immunization History   Administered Date(s) Administered    Influenza, FLUARIX, FLULAVAL, FLUZONE (age 10 mo+) AND AFLURIA, (age 1 y+), PF, 0.5mL 09/13/2022       Pneumococcal: No    Hepatitis: No    Examinations:  Diabetic Foot and Eye Exam HM Status   Topic Date Due    Diabetic Foot Care  Never done    Eye Exam  Never done        Dental exam: last appointment was: 2 years ago    Heart Protection:  BP Readings from Last 2 Encounters:   12/19/22 (!) 150/94   10/27/22 (!) 162/108        Lab Results   Component Value Date/Time    LDL, calculated 124 (H) 12/19/2022 03:53 PM        Kidney Protection:  No results found for: MCACR, MCA1, MCA2, MCA3, MCAU, MCAU2, MCALPOCT     Would benefit from [facility] related to Reducing Risks:  Yes      Actively participates in decision-making with provider regarding secondary prevention:  No      Stage of change: Preparation   Problem Solving  Current state  Hypoglycemia Management:  What are signs and symptoms of hypoglycemia that you experience: Pt reported being unaware of s/s of hypoglycemia    How do you prevent hypoglycemia: patient is unaware of how to prevent low blood sugars    How do you treat hypoglycemia: Patient is unaware of how to treat hypoglycemia    Hyperglycemia Management:  What are signs and symptoms of hyperglycemia that you experience: Extreme thirst, Frequent urination    How can you prevent hyperglycemia: patient is unaware of how to prevent high blood sugars    Sick Day Management:  What do you do differently on sick days:  Pt reported being unaware of self-management on sick days    Pattern Management:  Do you notice blood glucose patterns when you look at the readings in your meter or logbook? Not checking yet    How do you use the blood glucose readings from your meter or logbook? Not checking yet     Would benefit from [facility] related to Problem Solving: Yes      Articulates appropriate strategies to address hypoglycemia, hyperglycemia, sick day care and BG pattern: No      Stage of change: Preparation       Note: Content derived from the American Association of Diabetes Educators' Diabetes Education Curriculum: A Guide to Successful Self-Management (3rd edition)      Kajal Williamson RN on 1/11/2023 at 10:35 AM    I have personally reviewed the health record, including provider notes, laboratory data and current medications before making these care and education recommendations. The time spent in this effort is included in the total time. Total minutes: 30    SCPI score: 5.4 Skills Score: 3.6  Low: Reducing Risks(Q5),Problem Solving(Q6),Healthy Coping(Q7),Blood Sugar Monitoring(Q8),Reducing Risks(Q9) Confidence Score: 6.6  Low: Healthy Eating(Q1),Reducing Risks(Q3),Healthy AODLBA(Q6) Preparedness Score: 6.7  Low: Healthy Coping(Q3)  Healthy Eating Score: 6.0  Low: Skills(Q1) Taking Medication Score: 5.0  Low: Skills(Q2) Blood Sugar Monitoring Score: 5.6  Low: Skills(Q8) Reducing Risks Score: 4.3  Low: Skills(Q5),Skills(Q9)  Problem Solving Score: 5.3  Low: Skills(Q6) Healthy Coping Score: 4.7  Low: MDMMYC(R5) Being Active Score: 7.0  Low: Confidence(Q5),Preparedness(Q2)    Skills/Knowledge Questions  1. I know how to plan meals that have the best balance between carbohydrates, proteins and vegetables. 5  2. I know how my diabetes medications (pills, injectables and/or insulin) work in my body. 5  3.  I know when to check my blood sugar if I want to see how my body responded to a meal. 7  4. I know when to check my blood sugars to determine if my medication or insulin doses are correct. 5  5. I know what to do to prevent a low blood sugar when I exercise (either before, during, or after). 2  6. When I am sick, I know what to do differently with my diabetes management. 2  7. I know how stress can affect my diabetes management. 2  8. When I look at my blood sugars over a given week, I can explain what my blood sugar pattern is. 2  9. I know what my target levels are for A1c, blood pressure and cholesterol. 2  Confidence Questions  1. I am confident that I can plan balanced meals and snacks. 6  2. I am confident that I can manage my stress. 7  3. I am confident that I can prevent a low blood sugar during or after exercise. 6  4. I am confident that the next time I eat out, I will be able to choose foods that best keep my blood sugars in target. 6  5. I am confident I can include exercise into my schedule. 7  6. I am confident that I can use my daily blood sugars to adjust my diet, my activity, and/or my insulin. 7  7. When something out of my normal routine happens, I am confident that I can problem-solve and keep my diabetes on track. 7  Preparedness Questions  1. Within the next month, I will begin to eat more balanced meals and snacks. 8  2. Within the next month, I will choose an exercise activity and I will start fitting it into my schedule. 8  3. Within the next month, I will make a list of stress management options that work for me. 5  4. Within the next month, I will consistently plan ahead to prevent low blood sugars. 8  5. Within the next month, I will start adjusting my insulin doses on my own. 0  6. Within the next month, I will begin making changes to my diabetes management based on my daily blood sugars (eg - eating, activity and/or insulin). 7  7.  Within the next month, I will begin making changes to my diabetes management to meet my overall goals (eg - eating, activity and/or insulin).  7

## 2023-01-17 ENCOUNTER — OFFICE VISIT (OUTPATIENT)
Dept: INTERNAL MEDICINE CLINIC | Age: 58
End: 2023-01-17
Payer: COMMERCIAL

## 2023-01-17 VITALS
OXYGEN SATURATION: 97 % | TEMPERATURE: 96.9 F | WEIGHT: 315 LBS | DIASTOLIC BLOOD PRESSURE: 115 MMHG | SYSTOLIC BLOOD PRESSURE: 173 MMHG | HEART RATE: 80 BPM | HEIGHT: 74 IN | RESPIRATION RATE: 18 BRPM | BODY MASS INDEX: 40.43 KG/M2

## 2023-01-17 DIAGNOSIS — J45.40 MODERATE PERSISTENT REACTIVE AIRWAY DISEASE WITHOUT COMPLICATION: ICD-10-CM

## 2023-01-17 DIAGNOSIS — G47.33 OSA (OBSTRUCTIVE SLEEP APNEA): ICD-10-CM

## 2023-01-17 DIAGNOSIS — R06.09 DYSPNEA ON EXERTION: ICD-10-CM

## 2023-01-17 DIAGNOSIS — I10 MALIGNANT HYPERTENSION: Primary | ICD-10-CM

## 2023-01-17 PROCEDURE — 99214 OFFICE O/P EST MOD 30 MIN: CPT | Performed by: NURSE PRACTITIONER

## 2023-01-17 RX ORDER — LABETALOL 200 MG/1
200 TABLET, FILM COATED ORAL 2 TIMES DAILY
Qty: 180 TABLET | Refills: 1 | Status: SHIPPED | OUTPATIENT
Start: 2023-01-17

## 2023-01-17 RX ORDER — ALBUTEROL SULFATE 90 UG/1
AEROSOL, METERED RESPIRATORY (INHALATION)
Qty: 6.7 EACH | Refills: 1 | Status: SHIPPED | OUTPATIENT
Start: 2023-01-17

## 2023-01-17 NOTE — PROGRESS NOTES
Trey Leal (: 1965) is a 65 y.o. male, new patient, here for evaluation of the following chief complaint(s):  Follow-up (4 weeks for HTN med change )       ASSESSMENT/PLAN:  Below is the assessment and plan developed based on review of pertinent history, physical exam, labs, studies, and medications. 1. Malignant hypertension  -     labetaloL (NORMODYNE) 200 mg tablet; Take 1 Tablet by mouth two (2) times a day., Normal, Disp-180 Tablet, R-1  2. JESICA (obstructive sleep apnea)    Return in about 9 weeks (around 3/21/2023) for OV-HTN, CHOL, DM. Needs NV in 2 wks for BP check. Pt asked to complete follow by next visit: lose weight, increase physical activity, bring BP log to office visit, follow low salt diet, routine labs ordered, have labs drawn prior to ROV, call if any problems, changed  from amlodipine to BB for SE, likely r/t amlodipine. SUBJECTIVE/OBJECTIVE:  HPI    Hypertension Review:  The patient has hypertension, but found 12 yr old cousin with intent to sell heroine on . Removed from him and bite top off container with some spilling on his hand intending to discard. As pt was incarcerated for years for selling drugs. Started to feel sweaty, racing heart and blurred vision. Drank milk with emesis and felt better. Not seen in ER since felt better and still on probation. Accidental ingestion would not serve him well. Does NOT use drugs. Diet and Lifestyle: generally does try to follow a  low sodium diet, exercises sporadically   Home BP Monitoring: is not measured at home. Pertinent ROS: taking medications as instructed, no medication side effects noted. No HA's, chest pain on exertion, dyspnea on exertion, or swelling of ankles. BP Readings from Last 3 Encounters:   23 (!) 173/115   22 (!) 150/94   10/27/22 (!) 162/108     Still awaiting CPAP machine for JESICA. Not sleeping well overall.       Review of Systems  Constitutional: negative for fevers, chills, anorexia and weight loss  Respiratory:  negative for cough, hemoptysis, and wheezing  CV:   negative for chest pain, palpitations, and lower extremity edema  GI:   negative for nausea, vomiting, diarrhea, abdominal pain, and melena  Endo:               negative for polyuria,polydipsia,polyphagia, and heat intolerance  Genitourinary: negative for frequency, urgency, dysuria, retention, and hematuria  Integument:  negative for rash, ulcerations, and pruritus  Hematologic:  negative for easy bruising and bleeding  Musculoskel: negative for arthralgias, muscle weakness,and joint pain/swelling  Neurological:  negative for headaches, dizziness, vertigo,and memory/gait problems  Behavl/Psych: negative for feelings of anxiety, depression, suicide, and mood changes    Visit Vitals  BP (!) 173/115 (BP 1 Location: Left upper arm, BP Patient Position: Sitting, BP Cuff Size: Large adult)   Pulse 80   Temp 96.9 °F (36.1 °C) (Temporal)   Resp 18   Ht 6' 2\" (1.88 m)   Wt 339 lb (153.8 kg)   SpO2 97%   BMI 43.53 kg/m²       Wt Readings from Last 3 Encounters:   01/17/23 339 lb (153.8 kg)   12/19/22 328 lb (148.8 kg)   10/27/22 332 lb (150.6 kg)         Physical Exam:   General appearance - alert, well appearing, and in no distress. Mental status - A/O x 4,normal mood and affect. Chest - CTA. Symmetric chest rise. No wheezing. no distress. Heart - Normal rate & rhythm. Normal S1 & S2. No MGR. Abdomen- Soft, round. Non-distended, NT. No pulsatile masses or hernias. Ext-  No clubbing or cyanosis. +BLE, NP.  Skin-Warm and dry. No hyperpigmentation, ulcerations, or suspicious lesions. Neuro - Normal speech, no focal findings or movement disorder. Normal strength, gait, and muscle tone.      Results for orders placed or performed in visit on 90/62/75   METABOLIC PANEL, COMPREHENSIVE   Result Value Ref Range    Glucose 92 70 - 99 mg/dL    BUN 21 6 - 24 mg/dL    Creatinine 1.39 (H) 0.76 - 1.27 mg/dL    eGFR 59 (L) >59 mL/min/1.73 BUN/Creatinine ratio 15 9 - 20    Sodium 140 134 - 144 mmol/L    Potassium 3.1 (L) 3.5 - 5.2 mmol/L    Chloride 97 96 - 106 mmol/L    CO2 28 20 - 29 mmol/L    Calcium 9.3 8.7 - 10.2 mg/dL    Protein, total 7.5 6.0 - 8.5 g/dL    Albumin 4.7 3.8 - 4.9 g/dL    GLOBULIN, TOTAL 2.8 1.5 - 4.5 g/dL    A-G Ratio 1.7 1.2 - 2.2    Bilirubin, total 0.3 0.0 - 1.2 mg/dL    Alk. phosphatase 119 44 - 121 IU/L    AST (SGOT) 14 0 - 40 IU/L    ALT (SGPT) 10 0 - 44 IU/L   CBC WITH AUTOMATED DIFF   Result Value Ref Range    WBC 8.9 3.4 - 10.8 x10E3/uL    RBC 4.84 4.14 - 5.80 x10E6/uL    HGB 12.8 (L) 13.0 - 17.7 g/dL    HCT 38.8 37.5 - 51.0 %    MCV 80 79 - 97 fL    MCH 26.4 (L) 26.6 - 33.0 pg    MCHC 33.0 31.5 - 35.7 g/dL    RDW 13.9 11.6 - 15.4 %    PLATELET 549 580 - 746 x10E3/uL    NEUTROPHILS 73 Not Estab. %    Lymphocytes 18 Not Estab. %    MONOCYTES 8 Not Estab. %    EOSINOPHILS 1 Not Estab. %    BASOPHILS 0 Not Estab. %    ABS. NEUTROPHILS 6.4 1.4 - 7.0 x10E3/uL    Abs Lymphocytes 1.6 0.7 - 3.1 x10E3/uL    ABS. MONOCYTES 0.8 0.1 - 0.9 x10E3/uL    ABS. EOSINOPHILS 0.1 0.0 - 0.4 x10E3/uL    ABS. BASOPHILS 0.0 0.0 - 0.2 x10E3/uL    IMMATURE GRANULOCYTES 0 Not Estab. %    ABS. IMM. GRANS. 0.0 0.0 - 0.1 x10E3/uL   HEMOGLOBIN A1C WITH EAG   Result Value Ref Range    Hemoglobin A1c 6.6 (H) 4.8 - 5.6 %    Estimated average glucose 143 mg/dL   LIPID PANEL   Result Value Ref Range    Cholesterol, total 188 100 - 199 mg/dL    Triglyceride 98 0 - 149 mg/dL    HDL Cholesterol 46 >39 mg/dL    VLDL, calculated 18 5 - 40 mg/dL    LDL, calculated 124 (H) 0 - 99 mg/dL   TSH 3RD GENERATION   Result Value Ref Range    TSH 3.810 0.450 - 4.500 uIU/mL   T4, FREE   Result Value Ref Range    T4, Free 1.28 0.82 - 1.77 ng/dL   T3 TOTAL   Result Value Ref Range    T3, total 147 71 - 180 ng/dL   CVD REPORT   Result Value Ref Range    INTERPRETATION Note                An electronic signature was used to authenticate this note.   -- Eric Molina, NP

## 2023-01-17 NOTE — PROGRESS NOTES
Pt is here for   Chief Complaint   Patient presents with    Follow-up     4 weeks for HTN med change      1. Have you been to the ER, urgent care clinic since your last visit? Hospitalized since your last visit? No    2. Have you seen or consulted any other health care providers outside of the [address] [place] [address] since your last visit? Include any pap smears or colon screening.  No    Denies pain at this time

## 2023-01-17 NOTE — PATIENT INSTRUCTIONS
Avoid eating CHEESE NIPS before your next visit. Please check your blood pressure daily and record. Bring to your next visit.

## 2023-01-25 ENCOUNTER — CLINICAL SUPPORT (OUTPATIENT)
Dept: DIABETES SERVICES | Age: 58
End: 2023-01-25
Payer: COMMERCIAL

## 2023-01-25 DIAGNOSIS — E11.9 TYPE 2 DIABETES MELLITUS WITHOUT COMPLICATION, WITHOUT LONG-TERM CURRENT USE OF INSULIN (HCC): ICD-10-CM

## 2023-01-25 DIAGNOSIS — E11.9 DIABETES MELLITUS, NEW ONSET (HCC): ICD-10-CM

## 2023-01-25 PROCEDURE — G0108 DIAB MANAGE TRN  PER INDIV: HCPCS

## 2023-01-25 NOTE — PROGRESS NOTES
[address] [place] [address] for Diabetes Health  Diabetes Self-Management Education & Support Program  Encounter Note    SUMMARY  Diabetes self-care management training was completed related to What is Diabetes? And reducing risks was started. The participant will return on February 23 to continue DSMES related to reducing risks and healthy eating. The participant did not identify SMART Goal(s) and will practice knowledge and skills related to reducing risks to improve diabetes self-management. EVALUATION:  Mr Leal expressed understanding of T 2 DM disease process & the ADA targets for BG & A1c. He expressed understanding of the role of vaccinations, eye, dental & foot exams in preventing DM complications. [facility] le[facility]ard is not checking BG, needs to replace glucometer battery. He is up to date on Flu, Covid & Pneumonia vaccines. He needs to schedule dilated eye & podiatry appointments. RECOMMENDATIONS:  Start checking & logging BG, bring log to diabetes education sessions. TOPICS DISCUSSED TODAY:  WHAT IS DIABETES? Minutes: Lenora? 30      Next provider visit is scheduled for March 21, 2023       DATE DSMES TOPIC EVALUATION     1/25/2023 WHAT IS DIABETES? Role of the normal pancreas in energy balance and blood glucose control   The defect seen in diabetes   Signs & symptoms of diabetes   Diagnosis of diabetes   Types of diabetes   Blood glucose targets in non-pregnant & non-pregnant adults       The participant knows  Their type of diabetes: Yes  The basic physiologic defect: Yes  Blood glucose targets: Yes     DATE DSMES TOPIC EVALUATION     1/25/2023 HOW DO I STAY HEALTHY?    Prevention   Vaccinations   Preconception care (if applicable)  Examinations   Eye    Foot   Diabetic complications' prevention   Dental health   Heart health   Kidney Health   Nerve health   Sleep health      The participant has a personal diabetes care record to keep abreast of diabetes health No     The participant needs to address keeping track of diabetes health using a personal care record. Kiara Murray RN on 1/25/2023 at 3:51 PM    I have personally reviewed the health record, including provider notes, laboratory data and current medications before making these care and education recommendations. The time spent in this effort is included in the total time.   Total minutes: 60

## 2023-01-30 ENCOUNTER — TELEPHONE (OUTPATIENT)
Dept: SLEEP MEDICINE | Age: 58
End: 2023-01-30

## 2023-01-30 NOTE — TELEPHONE ENCOUNTER
Patient left message on [address] voicemail. Patient states [facility] didn't get the order we faxed on 12/30. Refaxed. Returned his call to let him know.

## 2023-01-31 ENCOUNTER — CLINICAL SUPPORT (OUTPATIENT)
Dept: INTERNAL MEDICINE CLINIC | Age: 58
End: 2023-01-31

## 2023-01-31 DIAGNOSIS — I10 MALIGNANT HYPERTENSION: ICD-10-CM

## 2023-01-31 RX ORDER — LABETALOL 200 MG/1
200 TABLET, FILM COATED ORAL EVERY 8 HOURS
Qty: 90 TABLET | Refills: 2 | Status: SHIPPED | OUTPATIENT
Start: 2023-01-31

## 2023-02-27 ENCOUNTER — DOCUMENTATION ONLY (OUTPATIENT)
Dept: SLEEP MEDICINE | Age: 58
End: 2023-02-27

## 2023-02-27 NOTE — PROGRESS NOTES
Spoke with Yvette Willingham at [address] who stated they are waiting on sleep sudy. Faxed sleep study results to [phone]. Patient informed.

## 2023-03-06 DIAGNOSIS — J41.0 SIMPLE CHRONIC BRONCHITIS (HCC): Primary | ICD-10-CM

## 2023-03-21 ENCOUNTER — OFFICE VISIT (OUTPATIENT)
Dept: INTERNAL MEDICINE CLINIC | Age: 58
End: 2023-03-21
Payer: COMMERCIAL

## 2023-03-21 VITALS
HEART RATE: 84 BPM | HEIGHT: 74 IN | BODY MASS INDEX: 40.43 KG/M2 | RESPIRATION RATE: 18 BRPM | SYSTOLIC BLOOD PRESSURE: 165 MMHG | DIASTOLIC BLOOD PRESSURE: 104 MMHG | WEIGHT: 315 LBS | TEMPERATURE: 98 F | OXYGEN SATURATION: 97 %

## 2023-03-21 DIAGNOSIS — E11.9 TYPE 2 DIABETES MELLITUS WITHOUT COMPLICATION, WITHOUT LONG-TERM CURRENT USE OF INSULIN (HCC): Primary | ICD-10-CM

## 2023-03-21 DIAGNOSIS — E78.2 MIXED HYPERLIPIDEMIA: ICD-10-CM

## 2023-03-21 DIAGNOSIS — I10 MALIGNANT HYPERTENSION: ICD-10-CM

## 2023-03-21 LAB
GLUCOSE POC: 155 MG/DL
HBA1C MFR BLD HPLC: 6.1 %

## 2023-03-21 PROCEDURE — 99214 OFFICE O/P EST MOD 30 MIN: CPT | Performed by: NURSE PRACTITIONER

## 2023-03-21 PROCEDURE — 82962 GLUCOSE BLOOD TEST: CPT | Performed by: NURSE PRACTITIONER

## 2023-03-21 PROCEDURE — 82044 UR ALBUMIN SEMIQUANTITATIVE: CPT | Performed by: NURSE PRACTITIONER

## 2023-03-21 PROCEDURE — 80061 LIPID PANEL: CPT | Performed by: NURSE PRACTITIONER

## 2023-03-21 PROCEDURE — 83036 HEMOGLOBIN GLYCOSYLATED A1C: CPT | Performed by: NURSE PRACTITIONER

## 2023-03-21 RX ORDER — LABETALOL 300 MG/1
300 TABLET, FILM COATED ORAL EVERY 8 HOURS
Qty: 90 TABLET | Refills: 2 | Status: SHIPPED | OUTPATIENT
Start: 2023-03-21

## 2023-03-21 NOTE — PROGRESS NOTES
Janine Leal (: 1965) is a 65 y.o. male, new patient, here for evaluation of the following chief complaint(s):  Follow-up (HTN, CHOL, DM)       ASSESSMENT/PLAN:  Below is the assessment and plan developed based on review of pertinent history, physical exam, labs, studies, and medications. 1. Malignant hypertension  -     AMB POC GLUCOSE BLOOD, BY GLUCOSE MONITORING DEVICE  -     AMB POC HEMOGLOBIN A1C  -     AMB POC LIPID PROFILE  -     AMB POC URINE, MICROALBUMIN, SEMIQUANT (3 RESULTS)  -     labetaloL (NORMODYNE) 300 mg tablet; Take 1 Tablet by mouth every eight (8) hours. Indications: high blood pressure, Normal, Disp-90 Tablet, R-2  2. Prediabetes  -     AMB POC GLUCOSE BLOOD, BY GLUCOSE MONITORING DEVICE  -     AMB POC HEMOGLOBIN A1C  -     AMB POC LIPID PROFILE  -     AMB POC URINE, MICROALBUMIN, SEMIQUANT (3 RESULTS)  3. Type 2 diabetes mellitus without complication, without long-term current use of insulin (HCC)  -     AMB POC GLUCOSE BLOOD, BY GLUCOSE MONITORING DEVICE  -     AMB POC HEMOGLOBIN A1C  -     AMB POC LIPID PROFILE  -     AMB POC URINE, MICROALBUMIN, SEMIQUANT (3 RESULTS)  -     REFERRAL TO PODIATRY  4. Mixed hyperlipidemia  -     AMB POC GLUCOSE BLOOD, BY GLUCOSE MONITORING DEVICE  -     AMB POC HEMOGLOBIN A1C  -     AMB POC LIPID PROFILE  -     AMB POC URINE, MICROALBUMIN, SEMIQUANT (3 RESULTS)    No follow-ups on file. Pt asked to complete follow by next visit: lose weight, increase physical activity, bring BP log to office visit, follow low salt diet, routine labs ordered, have labs drawn prior to ROV, call if any problems, changed  from amlodipine to BB for SE, likely r/t amlodipine. SUBJECTIVE/OBJECTIVE:  HPI    Pt presents to f/u HTN, DM, & CHOL. Home BP readings running 130-160/90-100s. Blood sugar level range: n/a, having trouble trying. Last eye exam: > 1 yr ago. Last foot exam/podiatry appt: TODAY. Taking meds as prescribed.  Denies side effects from medication, except more BM and urination, but not bothered by this and wants to continue metformin use. Feels good overall. No report of unilateral weakness, headaches, blurring vision, CP, SOB,or  leg swelling. No report of polydipsia, polyphagia, or polyuria.    BP Readings from Last 3 Encounters:   03/21/23 (!) 165/104   01/17/23 (!) 173/115   12/19/22 (!) 150/94       Last Point of Care HGB A1C  No results found for: [id number]   Lab Results   Component Value Date/Time    Hemoglobin A1c 6.6 (H) 12/19/2022 03:53 PM       Lab Results   Component Value Date/Time    Cholesterol, total 188 12/19/2022 03:53 PM    HDL Cholesterol 46 12/19/2022 03:53 PM    LDL, calculated 124 (H) 12/19/2022 03:53 PM    VLDL, calculated 18 12/19/2022 03:53 PM    Triglyceride 98 12/19/2022 03:53 PM            Review of Systems  Constitutional: negative for fevers, chills, anorexia and weight loss  Respiratory:  negative for cough, hemoptysis, and wheezing  CV:   negative for chest pain, palpitations, and lower extremity edema  GI:   negative for nausea, vomiting, diarrhea, abdominal pain, and melena  Endo:               negative for polyuria,polydipsia,polyphagia, and heat intolerance  Genitourinary: negative for frequency, urgency, dysuria, retention, and hematuria  Integument:  negative for rash, ulcerations, and pruritus  Hematologic:  negative for easy bruising and bleeding  Musculoskel: negative for arthralgias, muscle weakness,and joint pain/swelling  Neurological:  negative for headaches, dizziness, vertigo,and memory/gait problems  Behavl/Psych: negative for feelings of anxiety, depression, suicide, and mood changes    Visit Vitals  BP (!) 165/104 (BP 1 Location: Right upper arm, BP Patient Position: Sitting, BP Cuff Size: Large adult)   Pulse 84   Temp 98 °F (36.7 °C) (Temporal)   Resp 18   Ht 6' 2\" (1.88 m)   Wt 335 lb (152 kg)   SpO2 97%   BMI 43.01 kg/m²       Wt Readings from Last 3 Encounters:   03/21/23 335 lb (152 kg)   01/17/23 339 lb (153.8 kg)   12/19/22 328 lb (148.8 kg)         Physical Exam:   General appearance - alert, well appearing, and in no distress. Mental status - A/O x 4,normal mood and affect. Chest - CTA. Symmetric chest rise. No wheezing. no distress. Heart - Normal rate & rhythm. Normal S1 & S2. No MGR. Abdomen- Soft, round. Non-distended, NT. No pulsatile masses or hernias. Ext-  No clubbing or cyanosis. +BLE, NP.  Skin-Warm and dry. No hyperpigmentation, ulcerations, or suspicious lesions. Neuro - Normal speech, no focal findings or movement disorder. Normal strength, gait, and muscle tone. Diabetic foot exam: thick long toenails. No wounds noted. Left Foot:   Visual Exam: normal    Pulse DP: 2+ (normal)   Filament test: normal sensation    Vibratory sensation: normal      Right Foot:   Visual Exam: normal    Pulse DP: 2+ (normal)   Filament test: normal sensation    Vibratory sensation: normal              An electronic signature was used to authenticate this note.   -- Narendra Maguire NP

## 2023-03-21 NOTE — PROGRESS NOTES
Pt is here for   Chief Complaint   Patient presents with    Follow-up     HTN, CHOL, DM     1. \"Have you been to the ER, urgent care clinic since your last visit? Hospitalized since your last visit? \" No    2. \"Have you seen or consulted any other health care providers outside of the [address] [place] [address] since your last visit? \" No     3. For patients aged 39-70: Has the patient had a colonoscopy / FIT/ Cologuard? No      If the patient is female:    4. For patients aged 41-77: Has the patient had a mammogram within the past 2 years? NA - based on age or sex      11. For patients aged 21-65: Has the patient had a pap smear?  NA - based on age or sex

## 2023-03-22 LAB
ALBUMIN UR QL STRIP: 80 MG/L
CHOLEST SERPL-MCNC: 147 MG/DL
CREATININE, URINE POC: 150 MG/DL
HDLC SERPL-MCNC: 50 MG/DL
LDL CHOLESTEROL POC: 79 MG/DL
MICROALBUMIN/CREAT RATIO POC: <30 MG/G
TCHOL/HDL RATIO (POC): 3
TRIGL SERPL-MCNC: 89 MG/DL
VLDLC SERPL CALC-MCNC: 97 MG/DL

## 2023-03-28 ENCOUNTER — CLINICAL SUPPORT (OUTPATIENT)
Dept: INTERNAL MEDICINE CLINIC | Age: 58
End: 2023-03-28

## 2023-03-28 DIAGNOSIS — I10 MALIGNANT HYPERTENSION: Primary | ICD-10-CM

## 2023-03-28 RX ORDER — AMLODIPINE BESYLATE 5 MG/1
5 TABLET ORAL DAILY
Qty: 90 TABLET | Refills: 3 | Status: SHIPPED | OUTPATIENT
Start: 2023-03-28

## 2023-04-19 ENCOUNTER — CLINICAL SUPPORT (OUTPATIENT)
Dept: DIABETES SERVICES | Age: 58
End: 2023-04-19
Payer: COMMERCIAL

## 2023-04-19 DIAGNOSIS — E11.9 DIABETES MELLITUS, NEW ONSET (HCC): Primary | ICD-10-CM

## 2023-04-19 PROCEDURE — G0108 DIAB MANAGE TRN  PER INDIV: HCPCS

## 2023-04-19 NOTE — PROGRESS NOTES
[address] [place] [address] for Diabetes Health  Diabetes Self-Management Education & Support Program  Encounter Note    SUMMARY  Diabetes self-care management training was completed related to reducing risks. The participant will return on June 22 to continue DSMES related to healthy eating and monitoring. The participant did not identify SMART Goal(s) and will practice knowledge and skills related to reducing risks to improve diabetes self-management. EVALUATION:  Mr Leal expressed understanding of the relationship between DM & heart, kidney, eye, nerve & sleep health. He shared had a recent A1c done and was 6.1. Irving Maradiaga had been using a friend's glucometer to check BG and has run out of strips. I advised has a prescription for glucometer & supplies that was sent to pharmacy in December. He will contact pharmacy and  today if possible. Kunal shared had recent podiatry & dilated eye exams, will work on scheduling dental exam.     RECOMMENDATIONS:  Obtain glucometer and start checking/logging BG. TOPICS DISCUSSED TODAY:  HOW DO I STAY HEALTHY? 61      Next provider visit is scheduled for 6/22/23       DATE DSMES TOPIC EVALUATION     4/19/2023 HOW DO I STAY HEALTHY? Prevention   Vaccinations   Preconception care (if applicable)  Examinations   Eye    Foot   Diabetic complications' prevention   Dental health   Heart health   Kidney Health   Nerve health   Sleep health      The participant has a personal diabetes care record to keep abreast of diabetes health Yes , started today             Pedro Fuentes RN on 4/19/2023 at 2:23 PM    I have personally reviewed the health record, including provider notes, laboratory data and current medications before making these care and education recommendations. The time spent in this effort is included in the total time.   Total minutes: 60

## 2023-05-18 ENCOUNTER — NURSE ONLY (OUTPATIENT)
Age: 58
End: 2023-05-18
Payer: COMMERCIAL

## 2023-05-18 DIAGNOSIS — E11.9 DIABETES MELLITUS, NEW ONSET (HCC): Primary | ICD-10-CM

## 2023-05-18 PROCEDURE — G0108 DIAB MANAGE TRN  PER INDIV: HCPCS | Performed by: INTERNAL MEDICINE

## 2023-05-18 NOTE — PROGRESS NOTES
[employer] [facility] for Diabetes Health  Diabetes Self-Management Education & Support Program  Encounter Note      SUMMARY  Diabetes self-care management training was completed related to healthy eating. The participant will return on June 08 to continue DSMES related to monitoring and taking medications. The participant did identify SMART Goal(s) and will practice knowledge and skills related to reducing risks and healthy eating to improve diabetes self-management. EVALUATION:  Mr Castillo expressed understanding of using healthy plate to build balanced meals & manage portions, counting CHOs & reading nutrition facts labels to make more informed food choices. Raul Prudent is checking FBS, ranging 108 mg/dL - 112 mg/dL. He started a personal diabetes care record today. RECOMMENDATIONS:  Continue tracking self care practices to include BG, meals & exercise. TOPICS DISCUSSED TODAY:  WHAT CAN I EAT? 60      Next provider visit is scheduled for 6/8/23       SMART GOAL(S)   Use healthy plate for 1-2 meals a week (goal sewt 5/18/23)  ACHIEVEMENT OF GOAL(S) : 0-24%         DATE DSMES TOPIC EVALUATION     5/18/2023 WHAT CAN I EAT? General principles   Determining a healthy weight   Nutritional terms & tools   Healthy Plate method   Carbohydrate Counting   Reading food labels   Free apps   Pregnancy recommendations      The participant   Uses Healthy Plate principles in constructing meals: Yes  Reads food labels in choosing acceptable foods: Yes         Maninder Manning RN on 5/18/2023 at 4:51 PM    I have personally reviewed the health record, including provider notes, laboratory data and current medications before making these care and education recommendations. The time spent in this effort is included in the total time.   Total minutes: 60

## 2023-05-19 RX ORDER — LABETALOL 100 MG/1
TABLET, FILM COATED ORAL
Qty: 60 TABLET | Refills: 2 | OUTPATIENT
Start: 2023-05-19

## 2023-05-23 RX ORDER — LANCETS 30 GAUGE
EACH MISCELLANEOUS
COMMUNITY
Start: 2022-12-20

## 2023-05-23 RX ORDER — OLMESARTAN MEDOXOMIL 40 MG/1
40 TABLET ORAL DAILY
COMMUNITY
Start: 2022-09-13

## 2023-05-23 RX ORDER — ALBUTEROL SULFATE 90 UG/1
AEROSOL, METERED RESPIRATORY (INHALATION)
COMMUNITY
Start: 2023-03-28 | End: 2023-06-05 | Stop reason: SDUPTHER

## 2023-05-23 RX ORDER — ATORVASTATIN CALCIUM 20 MG/1
20 TABLET, FILM COATED ORAL
COMMUNITY
Start: 2022-09-16

## 2023-05-23 RX ORDER — CHLORTHALIDONE 25 MG/1
25 TABLET ORAL DAILY
COMMUNITY
Start: 2022-09-13

## 2023-05-23 RX ORDER — POTASSIUM CHLORIDE 20 MEQ/1
20 TABLET, EXTENDED RELEASE ORAL DAILY
COMMUNITY
Start: 2022-09-13

## 2023-05-23 RX ORDER — LABETALOL 300 MG/1
300 TABLET, FILM COATED ORAL EVERY 8 HOURS
COMMUNITY
Start: 2023-03-21

## 2023-05-23 RX ORDER — AMLODIPINE BESYLATE 5 MG/1
5 TABLET ORAL DAILY
COMMUNITY
Start: 2023-03-28

## 2023-06-03 DIAGNOSIS — J45.40 MODERATE PERSISTENT ASTHMA, UNCOMPLICATED: ICD-10-CM

## 2023-06-03 DIAGNOSIS — R06.09 OTHER FORMS OF DYSPNEA: ICD-10-CM

## 2023-06-05 RX ORDER — ALBUTEROL SULFATE 90 UG/1
AEROSOL, METERED RESPIRATORY (INHALATION)
Qty: 6.7 EACH | Refills: 0 | Status: SHIPPED | OUTPATIENT
Start: 2023-06-05

## 2023-06-21 ENCOUNTER — NURSE ONLY (OUTPATIENT)
Age: 58
End: 2023-06-21

## 2023-06-21 DIAGNOSIS — E11.9 DIABETES MELLITUS, NEW ONSET (HCC): Primary | ICD-10-CM

## 2023-06-21 NOTE — PROGRESS NOTES
PHYSICAL ACTIVITY AFFECT MY DIABETES? Benefits of physical activity   Beginning a program of physical activity   Walking   Pedometers   Goal setting   Structured physical activity program   Aerobic activity   Resistance   Flexibility   Balance   Physical activity program progression   Safety issues   Barriers to physical activity   Facilitators of physical activity        The participant has established a regular physical activity plan: No    The participant needs to address increasing physical activity to include aerobic exercise. Jhon Vasquez RN on 6/21/2023 at 2:25 PM    I have personally reviewed the health record, including provider notes, laboratory data and current medications before making these care and education recommendations. The time spent in this effort is included in the total time.   Total minutes: 60

## 2023-06-22 ENCOUNTER — OFFICE VISIT (OUTPATIENT)
Facility: CLINIC | Age: 58
End: 2023-06-22
Payer: COMMERCIAL

## 2023-06-22 VITALS
RESPIRATION RATE: 19 BRPM | DIASTOLIC BLOOD PRESSURE: 107 MMHG | BODY MASS INDEX: 40.43 KG/M2 | OXYGEN SATURATION: 99 % | HEART RATE: 78 BPM | HEIGHT: 74 IN | SYSTOLIC BLOOD PRESSURE: 158 MMHG | TEMPERATURE: 98 F | WEIGHT: 315 LBS

## 2023-06-22 DIAGNOSIS — M54.31 RIGHT SIDED SCIATICA: ICD-10-CM

## 2023-06-22 DIAGNOSIS — I10 PRIMARY HYPERTENSION: Primary | ICD-10-CM

## 2023-06-22 DIAGNOSIS — E11.9 TYPE 2 DIABETES MELLITUS WITHOUT COMPLICATION, WITHOUT LONG-TERM CURRENT USE OF INSULIN (HCC): ICD-10-CM

## 2023-06-22 PROBLEM — Z99.89 OSA ON CPAP: Status: ACTIVE | Noted: 2022-09-13

## 2023-06-22 PROBLEM — G47.33 OSA ON CPAP: Status: ACTIVE | Noted: 2022-09-13

## 2023-06-22 PROBLEM — G47.33 OSA (OBSTRUCTIVE SLEEP APNEA): Status: RESOLVED | Noted: 2023-01-17 | Resolved: 2023-06-22

## 2023-06-22 PROCEDURE — 99214 OFFICE O/P EST MOD 30 MIN: CPT | Performed by: NURSE PRACTITIONER

## 2023-06-22 PROCEDURE — 3077F SYST BP >= 140 MM HG: CPT | Performed by: NURSE PRACTITIONER

## 2023-06-22 PROCEDURE — 3080F DIAST BP >= 90 MM HG: CPT | Performed by: NURSE PRACTITIONER

## 2023-06-22 SDOH — ECONOMIC STABILITY: FOOD INSECURITY: WITHIN THE PAST 12 MONTHS, THE FOOD YOU BOUGHT JUST DIDN'T LAST AND YOU DIDN'T HAVE MONEY TO GET MORE.: NEVER TRUE

## 2023-06-22 SDOH — ECONOMIC STABILITY: INCOME INSECURITY: HOW HARD IS IT FOR YOU TO PAY FOR THE VERY BASICS LIKE FOOD, HOUSING, MEDICAL CARE, AND HEATING?: NOT HARD AT ALL

## 2023-06-22 SDOH — ECONOMIC STABILITY: HOUSING INSECURITY
IN THE LAST 12 MONTHS, WAS THERE A TIME WHEN YOU DID NOT HAVE A STEADY PLACE TO SLEEP OR SLEPT IN A SHELTER (INCLUDING NOW)?: NO

## 2023-06-22 SDOH — ECONOMIC STABILITY: FOOD INSECURITY: WITHIN THE PAST 12 MONTHS, YOU WORRIED THAT YOUR FOOD WOULD RUN OUT BEFORE YOU GOT MONEY TO BUY MORE.: NEVER TRUE

## 2023-06-22 ASSESSMENT — PATIENT HEALTH QUESTIONNAIRE - PHQ9
SUM OF ALL RESPONSES TO PHQ QUESTIONS 1-9: 0
2. FEELING DOWN, DEPRESSED OR HOPELESS: 0
SUM OF ALL RESPONSES TO PHQ9 QUESTIONS 1 & 2: 0
SUM OF ALL RESPONSES TO PHQ QUESTIONS 1-9: 0
1. LITTLE INTEREST OR PLEASURE IN DOING THINGS: 0

## 2023-06-22 NOTE — PROGRESS NOTES
Pt is here for   Chief Complaint   Patient presents with    Follow-up     HTN, DM    Back Pain     Pt states that he's dealing with sciatica        1. Have you been to the ER, urgent care clinic since your last visit? Hospitalized since your last visit? No    2. Have you seen or consulted any other health care providers outside of the [address] [place] [address] since your last visit? Include any pap smears or colon screening.  No

## 2023-06-22 NOTE — PROGRESS NOTES
[facility] Page 1965 is a 63 y.o. male, Established patient, here for evaluation of the following chief complaint(s):  Follow-up (HTN, DM) and Back Pain (Pt states that he's dealing with sciatica )      ASSESSMENT/PLAN:  Below is the assessment and plan developed based on review of pertinent history, physical exam, labs, studies, and medications. 1. Primary hypertension  Not at goal, pt NOT taking amlodipine. Asked to take and return for NV in 2 wks to eval if med change needed. [facility]I can increase amlodipine to 10 mg.    2. Right sided sciatica  Improved, use of NSAID and stretches advised    3. Type 2 diabetes mellitus without complication, without long-term current use of insulin (HCC)  Stable, continue metformin                Pt asked to complete follow by next visit: call if any problems, continue taking meds as prescribed. SUBJECTIVE/OBJECTIVE:  HPI    Pt presents to f/u HTN &  DM. Home BP readings running 150/90's. Blood sugar level range: 100-110's. In DME now. Last eye exam: < 1 yr ago. Last foot exam/podiatry appt: < 1 yr ago. Taking meds as prescribed, except amlodipine since he thought this was discontinued. Denies side effects from medication. Feels good. No report of unilateral weakness, headaches, blurring vision, CP, SOB,or  leg swelling. No report of polydipsia, polyphagia, or polyuria.    BP Readings from Last 3 Encounters:   06/22/23 (!) 158/107   03/21/23 (!) 165/104   01/17/23 (!) 173/115       Last Point of Care HGB A1C  Hemoglobin A1C, POC   Date Value Ref Range Status   03/21/2023 6.1 % Final      Lab Results   Component Value Date/Time    ITS5CHUZ 6.1 03/21/2023 03:27 PM       Lab Results   Component Value Date    CHOL 188 12/19/2022    CHOL 228 (H) 09/14/2022     Lab Results   Component Value Date    TRIG 98 12/19/2022    TRIG 83 09/14/2022     Lab Results   Component Value Date    HDL 46 12/19/2022    HDL 51 09/14/2022     Lab Results   Component Value Date    LDLCALC 124 (H)

## 2023-06-22 NOTE — PATIENT INSTRUCTIONS
Use heat for 20 minutes, followed by topical ointments like ROLL-ON Aleve, Aspercreme, Tiger Balm, or Capsaicin. May also use ASPERCREME with Lidocaine or Thermacare PAIN PATCHES, available over the counter. If pain continues take Tylenol arthritis up to 3 times daily for your pain. Use heat BEFORE activity/physical therapy and ice AFTER for about 20-30 minutes. May take NSAIDS, like Advil, Ibuprofen, or Aleve for moderate pain. Wait 1 hr, if your pain continues, take your muscle relaxer.

## 2023-07-06 ENCOUNTER — NURSE ONLY (OUTPATIENT)
Facility: CLINIC | Age: 58
End: 2023-07-06

## 2023-07-06 VITALS
HEART RATE: 78 BPM | BODY MASS INDEX: 40.43 KG/M2 | SYSTOLIC BLOOD PRESSURE: 149 MMHG | WEIGHT: 315 LBS | OXYGEN SATURATION: 100 % | TEMPERATURE: 97 F | HEIGHT: 74 IN | RESPIRATION RATE: 18 BRPM | DIASTOLIC BLOOD PRESSURE: 95 MMHG

## 2023-07-06 DIAGNOSIS — I10 PRIMARY HYPERTENSION: Primary | ICD-10-CM

## 2023-07-16 DIAGNOSIS — J41.0 SIMPLE CHRONIC BRONCHITIS (HCC): ICD-10-CM

## 2023-07-17 RX ORDER — TIOTROPIUM BROMIDE 18 UG/1
CAPSULE ORAL; RESPIRATORY (INHALATION)
Qty: 30 CAPSULE | Refills: 2 | Status: SHIPPED | OUTPATIENT
Start: 2023-07-17

## 2023-08-28 DIAGNOSIS — R06.09 OTHER FORMS OF DYSPNEA: ICD-10-CM

## 2023-08-28 DIAGNOSIS — J45.40 MODERATE PERSISTENT ASTHMA, UNCOMPLICATED: ICD-10-CM

## 2023-08-31 RX ORDER — LABETALOL 100 MG/1
TABLET, FILM COATED ORAL
Qty: 60 TABLET | Refills: 2 | Status: SHIPPED | OUTPATIENT
Start: 2023-08-31

## 2023-08-31 RX ORDER — ALBUTEROL SULFATE 90 UG/1
AEROSOL, METERED RESPIRATORY (INHALATION)
Qty: 6.7 EACH | Refills: 0 | Status: SHIPPED | OUTPATIENT
Start: 2023-08-31

## 2023-09-22 ENCOUNTER — OFFICE VISIT (OUTPATIENT)
Facility: CLINIC | Age: 58
End: 2023-09-22
Payer: COMMERCIAL

## 2023-09-22 VITALS
BODY MASS INDEX: 40.43 KG/M2 | WEIGHT: 315 LBS | SYSTOLIC BLOOD PRESSURE: 137 MMHG | TEMPERATURE: 97.4 F | OXYGEN SATURATION: 95 % | HEART RATE: 78 BPM | RESPIRATION RATE: 20 BRPM | DIASTOLIC BLOOD PRESSURE: 93 MMHG | HEIGHT: 74 IN

## 2023-09-22 DIAGNOSIS — N40.1 BPH ASSOCIATED WITH NOCTURIA: ICD-10-CM

## 2023-09-22 DIAGNOSIS — R35.1 BPH ASSOCIATED WITH NOCTURIA: ICD-10-CM

## 2023-09-22 DIAGNOSIS — I10 PRIMARY HYPERTENSION: ICD-10-CM

## 2023-09-22 DIAGNOSIS — E87.6 DIURETIC-INDUCED HYPOKALEMIA: ICD-10-CM

## 2023-09-22 DIAGNOSIS — T50.2X5A DIURETIC-INDUCED HYPOKALEMIA: ICD-10-CM

## 2023-09-22 DIAGNOSIS — E11.9 TYPE 2 DIABETES MELLITUS WITHOUT COMPLICATION, WITHOUT LONG-TERM CURRENT USE OF INSULIN (HCC): ICD-10-CM

## 2023-09-22 DIAGNOSIS — N52.9 VASCULOGENIC ERECTILE DYSFUNCTION, UNSPECIFIED VASCULOGENIC ERECTILE DYSFUNCTION TYPE: Primary | ICD-10-CM

## 2023-09-22 DIAGNOSIS — E78.2 MIXED HYPERLIPIDEMIA: ICD-10-CM

## 2023-09-22 DIAGNOSIS — Z23 ENCOUNTER FOR IMMUNIZATION: ICD-10-CM

## 2023-09-22 DIAGNOSIS — J44.1 CHRONIC OBSTRUCTIVE PULMONARY DISEASE WITH ACUTE EXACERBATION (HCC): ICD-10-CM

## 2023-09-22 PROCEDURE — 3075F SYST BP GE 130 - 139MM HG: CPT | Performed by: NURSE PRACTITIONER

## 2023-09-22 PROCEDURE — 90471 IMMUNIZATION ADMIN: CPT | Performed by: NURSE PRACTITIONER

## 2023-09-22 PROCEDURE — 90674 CCIIV4 VAC NO PRSV 0.5 ML IM: CPT | Performed by: NURSE PRACTITIONER

## 2023-09-22 PROCEDURE — 3080F DIAST BP >= 90 MM HG: CPT | Performed by: NURSE PRACTITIONER

## 2023-09-22 PROCEDURE — 99214 OFFICE O/P EST MOD 30 MIN: CPT | Performed by: NURSE PRACTITIONER

## 2023-09-22 RX ORDER — LABETALOL 300 MG/1
300 TABLET, FILM COATED ORAL EVERY 8 HOURS
Qty: 270 TABLET | Refills: 3 | Status: SHIPPED | OUTPATIENT
Start: 2023-09-22

## 2023-09-22 RX ORDER — PREDNISONE 20 MG/1
40 TABLET ORAL DAILY
Qty: 10 TABLET | Refills: 0 | Status: SHIPPED | OUTPATIENT
Start: 2023-09-22 | End: 2023-09-27

## 2023-09-22 RX ORDER — TIOTROPIUM BROMIDE 18 UG/1
18 CAPSULE ORAL; RESPIRATORY (INHALATION) DAILY
Qty: 90 CAPSULE | Refills: 3 | Status: SHIPPED | OUTPATIENT
Start: 2023-09-22

## 2023-09-22 RX ORDER — ATORVASTATIN CALCIUM 20 MG/1
20 TABLET, FILM COATED ORAL DAILY
Qty: 90 TABLET | Refills: 3 | Status: SHIPPED | OUTPATIENT
Start: 2023-09-22

## 2023-09-22 RX ORDER — POTASSIUM CHLORIDE 20 MEQ/1
20 TABLET, EXTENDED RELEASE ORAL DAILY
Qty: 90 TABLET | Refills: 3 | Status: SHIPPED | OUTPATIENT
Start: 2023-09-22

## 2023-09-22 RX ORDER — SILDENAFIL 50 MG/1
TABLET, FILM COATED ORAL
Qty: 15 TABLET | Refills: 2 | Status: SHIPPED | OUTPATIENT
Start: 2023-09-22

## 2023-09-22 RX ORDER — OLMESARTAN MEDOXOMIL 40 MG/1
40 TABLET ORAL DAILY
Qty: 90 TABLET | Refills: 3 | Status: SHIPPED | OUTPATIENT
Start: 2023-09-22

## 2023-09-22 RX ORDER — AMLODIPINE BESYLATE 5 MG/1
5 TABLET ORAL DAILY
Qty: 90 TABLET | Refills: 3 | Status: SHIPPED | OUTPATIENT
Start: 2023-09-22

## 2023-09-22 RX ORDER — CHLORTHALIDONE 25 MG/1
25 TABLET ORAL DAILY
Qty: 90 TABLET | Refills: 3 | Status: SHIPPED | OUTPATIENT
Start: 2023-09-22

## 2023-09-22 ASSESSMENT — COPD QUESTIONNAIRES
QUESTION6_LEAVINGHOUSE: 3
QUESTION1_COUGHFREQUENCY: 2
QUESTION7_SLEEPQUALITY: 5
QUESTION4_WALKINCLINE: 4
QUESTION8_ENERGYLEVEL: 4
CAT_TOTALSCORE: 25
QUESTION3_CHESTTIGHTNESS: 2
QUESTION5_HOMEACTIVITIES: 3
QUESTION2_CHESTPHLEGM: 2

## 2023-09-22 NOTE — PROGRESS NOTES
Maribeth Ceja Page 1965 is a 63 y.o. male, Established patient, here for evaluation of the following chief complaint(s):  Follow-up (Patient is here to follow up on Type 2 DM. )      ASSESSMENT/PLAN:  Below is the assessment and plan developed based on review of pertinent history, physical exam, labs, studies, and medications. 1. Chronic obstructive pulmonary disease with acute exacerbation (HCC)  Not improved, prednisone sent. - tiotropium (SPIRIVA HANDIHALER) 18 MCG inhalation capsule; Inhale 1 capsule into the lungs daily  Dispense: 90 capsule; Refill: 3  - predniSONE (DELTASONE) 20 MG tablet; Take 2 tablets by mouth daily for 5 days  Dispense: 10 tablet; Refill: 0    2. Vasculogenic erectile dysfunction, unspecified vasculogenic erectile dysfunction type  New, trial of viagra sent. [facility] will refer to [facility]. - sildenafil (VIAGRA) 50 MG tablet; Start with 1/2 tab, by mouth, ~30 min before sex, may take up to 2 tabs to achieve an erection. Dispense: 15 tablet; Refill: 2    3. Mixed hyperlipidemia  Stable, continue statin. - atorvastatin (LIPITOR) 20 MG tablet; Take 1 tablet by mouth daily  Dispense: 90 tablet; Refill: 3  - Lipid Panel; Future    4. Diuretic-induced hypokalemia  Stable, continue oral supplementation. - potassium chloride (KLOR-CON M) 20 MEQ extended release tablet; Take 1 tablet by mouth daily  Dispense: 90 tablet; Refill: 3  - Comprehensive Metabolic Panel; Future    5. Type 2 diabetes mellitus without complication, without long-term current use of insulin (HCC)  Stable, continue metformin use. - olmesartan (BENICAR) 40 MG tablet; Take 1 tablet by mouth daily  Dispense: 90 tablet; Refill: 3  - metFORMIN (GLUCOPHAGE) 500 MG tablet; Take 1 tablet by mouth 2 times daily (with meals)  Dispense: 180 tablet; Refill: 3  - Hemoglobin A1C; Future  - Vitamin D 25 Hydroxy; Future  - CBC with Auto Differential; Future    6. Primary hypertension  Stable, continue ARB, BB, diuretic, and CCB.   -

## 2023-09-23 LAB
25(OH)D3 SERPL-MCNC: <9 NG/ML (ref 30–100)
ALBUMIN SERPL-MCNC: 4 G/DL (ref 3.5–5)
ALBUMIN/GLOB SERPL: 1.1 (ref 1.1–2.2)
ALP SERPL-CCNC: 100 U/L (ref 45–117)
ALT SERPL-CCNC: 20 U/L (ref 12–78)
ANION GAP SERPL CALC-SCNC: 4 MMOL/L (ref 5–15)
AST SERPL-CCNC: 12 U/L (ref 15–37)
BASOPHILS # BLD: 0 K/UL (ref 0–0.1)
BASOPHILS NFR BLD: 0 % (ref 0–1)
BILIRUB SERPL-MCNC: 0.4 MG/DL (ref 0.2–1)
BUN SERPL-MCNC: 29 MG/DL (ref 6–20)
BUN/CREAT SERPL: 19 (ref 12–20)
CALCIUM SERPL-MCNC: 8.8 MG/DL (ref 8.5–10.1)
CHLORIDE SERPL-SCNC: 101 MMOL/L (ref 97–108)
CHOLEST SERPL-MCNC: 211 MG/DL
CO2 SERPL-SCNC: 33 MMOL/L (ref 21–32)
CREAT SERPL-MCNC: 1.54 MG/DL (ref 0.7–1.3)
DIFFERENTIAL METHOD BLD: ABNORMAL
EOSINOPHIL # BLD: 0.1 K/UL (ref 0–0.4)
EOSINOPHIL NFR BLD: 1 % (ref 0–7)
ERYTHROCYTE [DISTWIDTH] IN BLOOD BY AUTOMATED COUNT: 15.7 % (ref 11.5–14.5)
EST. AVERAGE GLUCOSE BLD GHB EST-MCNC: 131 MG/DL
GLOBULIN SER CALC-MCNC: 3.7 G/DL (ref 2–4)
GLUCOSE SERPL-MCNC: 100 MG/DL (ref 65–100)
HBA1C MFR BLD: 6.2 % (ref 4–5.6)
HCT VFR BLD AUTO: 42.3 % (ref 36.6–50.3)
HDLC SERPL-MCNC: 59 MG/DL
HDLC SERPL: 3.6 (ref 0–5)
HGB BLD-MCNC: 12.7 G/DL (ref 12.1–17)
IMM GRANULOCYTES # BLD AUTO: 0 K/UL (ref 0–0.04)
IMM GRANULOCYTES NFR BLD AUTO: 0 % (ref 0–0.5)
LDLC SERPL CALC-MCNC: 136.8 MG/DL (ref 0–100)
LYMPHOCYTES # BLD: 1.1 K/UL (ref 0.8–3.5)
LYMPHOCYTES NFR BLD: 15 % (ref 12–49)
MCH RBC QN AUTO: 25.7 PG (ref 26–34)
MCHC RBC AUTO-ENTMCNC: 30 G/DL (ref 30–36.5)
MCV RBC AUTO: 85.6 FL (ref 80–99)
MONOCYTES # BLD: 0.6 K/UL (ref 0–1)
MONOCYTES NFR BLD: 7 % (ref 5–13)
NEUTS SEG # BLD: 6 K/UL (ref 1.8–8)
NEUTS SEG NFR BLD: 77 % (ref 32–75)
NRBC # BLD: 0 K/UL (ref 0–0.01)
NRBC BLD-RTO: 0 PER 100 WBC
PLATELET # BLD AUTO: 254 K/UL (ref 150–400)
PMV BLD AUTO: 11.4 FL (ref 8.9–12.9)
POTASSIUM SERPL-SCNC: 3.3 MMOL/L (ref 3.5–5.1)
PROT SERPL-MCNC: 7.7 G/DL (ref 6.4–8.2)
PSA SERPL-MCNC: 2.3 NG/ML (ref 0.01–4)
RBC # BLD AUTO: 4.94 M/UL (ref 4.1–5.7)
SODIUM SERPL-SCNC: 138 MMOL/L (ref 136–145)
TRIGL SERPL-MCNC: 76 MG/DL
VLDLC SERPL CALC-MCNC: 15.2 MG/DL
WBC # BLD AUTO: 7.8 K/UL (ref 4.1–11.1)

## 2023-09-27 DIAGNOSIS — E87.6 DIURETIC-INDUCED HYPOKALEMIA: ICD-10-CM

## 2023-09-27 DIAGNOSIS — N18.31 TYPE 2 DIABETES MELLITUS WITH STAGE 3A CHRONIC KIDNEY DISEASE, WITHOUT LONG-TERM CURRENT USE OF INSULIN (HCC): Primary | ICD-10-CM

## 2023-09-27 DIAGNOSIS — T50.2X5A DIURETIC-INDUCED HYPOKALEMIA: ICD-10-CM

## 2023-09-27 DIAGNOSIS — E11.22 TYPE 2 DIABETES MELLITUS WITH STAGE 3A CHRONIC KIDNEY DISEASE, WITHOUT LONG-TERM CURRENT USE OF INSULIN (HCC): Primary | ICD-10-CM

## 2023-09-27 DIAGNOSIS — E55.9 VITAMIN D DEFICIENCY: ICD-10-CM

## 2023-09-27 RX ORDER — ERGOCALCIFEROL 1.25 MG/1
50000 CAPSULE ORAL WEEKLY
Qty: 12 CAPSULE | Refills: 3 | Status: SHIPPED | OUTPATIENT
Start: 2023-09-27

## 2023-09-27 RX ORDER — FINERENONE 10 MG/1
10 TABLET, FILM COATED ORAL DAILY
Qty: 90 TABLET | Refills: 0 | Status: SHIPPED | OUTPATIENT
Start: 2023-09-27

## 2023-09-27 RX ORDER — POTASSIUM CHLORIDE 750 MG/1
30 TABLET, EXTENDED RELEASE ORAL DAILY
Qty: 270 TABLET | Refills: 3 | Status: SHIPPED | OUTPATIENT
Start: 2023-09-27

## 2023-10-10 DIAGNOSIS — J45.40 MODERATE PERSISTENT ASTHMA, UNCOMPLICATED: ICD-10-CM

## 2023-10-10 DIAGNOSIS — R06.09 OTHER FORMS OF DYSPNEA: ICD-10-CM

## 2023-10-23 RX ORDER — ALBUTEROL SULFATE 90 UG/1
AEROSOL, METERED RESPIRATORY (INHALATION)
Qty: 6.7 EACH | Refills: 0 | Status: SHIPPED | OUTPATIENT
Start: 2023-10-23

## 2023-10-25 ENCOUNTER — TELEPHONE (OUTPATIENT)
Facility: CLINIC | Age: 58
End: 2023-10-25

## 2023-10-25 NOTE — TELEPHONE ENCOUNTER
Patient called stating that his insurance in not covering Finerenone (Siva Marci) 10 MG TABS  and wants to know is something else can be called in or PA be done

## 2023-11-19 DIAGNOSIS — R06.09 OTHER FORMS OF DYSPNEA: ICD-10-CM

## 2023-11-19 DIAGNOSIS — J44.1 CHRONIC OBSTRUCTIVE PULMONARY DISEASE WITH ACUTE EXACERBATION (HCC): ICD-10-CM

## 2023-11-19 DIAGNOSIS — J45.40 MODERATE PERSISTENT ASTHMA, UNCOMPLICATED: ICD-10-CM

## 2023-11-21 RX ORDER — ALBUTEROL SULFATE 90 UG/1
AEROSOL, METERED RESPIRATORY (INHALATION)
Qty: 6.7 EACH | Refills: 0 | Status: SHIPPED | OUTPATIENT
Start: 2023-11-21

## 2023-11-21 RX ORDER — TIOTROPIUM BROMIDE 18 UG/1
CAPSULE ORAL; RESPIRATORY (INHALATION)
Qty: 30 CAPSULE | Refills: 11 | Status: SHIPPED | OUTPATIENT
Start: 2023-11-21

## 2023-11-21 RX ORDER — POTASSIUM CHLORIDE 1500 MG/1
TABLET, EXTENDED RELEASE ORAL
Qty: 30 TABLET | Refills: 11 | OUTPATIENT
Start: 2023-11-21

## 2024-01-29 DIAGNOSIS — R06.09 OTHER FORMS OF DYSPNEA: ICD-10-CM

## 2024-01-29 DIAGNOSIS — J45.40 MODERATE PERSISTENT ASTHMA, UNCOMPLICATED: ICD-10-CM

## 2024-01-29 RX ORDER — ALBUTEROL SULFATE 90 UG/1
AEROSOL, METERED RESPIRATORY (INHALATION)
Qty: 6.7 EACH | Refills: 0 | Status: SHIPPED | OUTPATIENT
Start: 2024-01-29

## 2024-03-05 DIAGNOSIS — E11.9 TYPE 2 DIABETES MELLITUS WITHOUT COMPLICATIONS (HCC): ICD-10-CM

## 2024-03-05 DIAGNOSIS — R06.09 OTHER FORMS OF DYSPNEA: ICD-10-CM

## 2024-03-05 DIAGNOSIS — J45.40 MODERATE PERSISTENT ASTHMA, UNCOMPLICATED: ICD-10-CM

## 2024-03-05 DIAGNOSIS — I10 ESSENTIAL (PRIMARY) HYPERTENSION: ICD-10-CM

## 2024-03-05 NOTE — TELEPHONE ENCOUNTER
Duplicate request:   09/22/2023 Normodyne 300 mg #270 with 3 refills,  [facility] is requesting Normodyne 200 mg,    09/27/2023 Klor-Con M 10 meq #270 with 3 refills,   [facility] is requesting Klor-Con M20.   Updated prescriptions were sent to [id number].       Last appointment: 09/22/2023 NP Ino   Next appointment: 03/2/2024 NP Ino   Previous refill encounter(s):   12/20/2022 Blood Glucose Test Strips #200 with 3 refills,  01/29/2024 Albuterol HFA INH #6.7 gram.     For Pharmacy Admin Tracking Only    Program: Medication Refill  Intervention Detail: Discontinued Rx: 2, reason: Duplicate Therapy and New Rx: 2, reason: Patient Preference  Time Spent (min): 10      Requested Prescriptions     Pending Prescriptions Disp Refills    blood glucose test strips (ONETOUCH ULTRA) strip [Pharmacy Med Name: ONE TOUCH ULTRA BLUE TEST STRP] 200 strip 0     Sig: Check blood sugar 2 times daily: E11.65, may substitute for insurance preferred strips.    labetalol (NORMODYNE) 200 MG tablet [Pharmacy Med Name: LABETALOL  MG TABLET] 60 tablet 5     Sig: TAKE 1 TABLET BY MOUTH TWO TIMES A DAY.    albuterol sulfate HFA (PROVENTIL;VENTOLIN;PROAIR) 108 (90 Base) MCG/ACT inhaler [Pharmacy Med Name: ALBUTEROL HFA (PROVENTIL) INH] 6.7 each 0     Sig: INHALE 2 PUFFS BY MOUTH EVERY 4 HOURS AS NEEDED FOR WHEEZING OR SHORTNESS OF BREATH, PERSISTENT COUGHING.    KLOR-CON M20 20 MEQ extended release tablet [Pharmacy Med Name: KLOR-CON M20 TABLET] 30 tablet 11     Sig: TAKE 1 TABLET BY MOUTH DAILY. WITH DIURETIC (HYDROCHLORTHIAZIDE OR LASIX) INDICATIONS: PREVENTION OF LOW POTASSIUM IN THE BLOOD

## 2024-03-07 RX ORDER — POTASSIUM CHLORIDE 1500 MG/1
TABLET, EXTENDED RELEASE ORAL
Qty: 30 TABLET | Refills: 0 | Status: SHIPPED | OUTPATIENT
Start: 2024-03-07

## 2024-03-07 RX ORDER — LABETALOL 200 MG/1
200 TABLET, FILM COATED ORAL 2 TIMES DAILY
Qty: 60 TABLET | Refills: 0 | Status: SHIPPED | OUTPATIENT
Start: 2024-03-07

## 2024-03-07 RX ORDER — BLOOD SUGAR DIAGNOSTIC
STRIP MISCELLANEOUS
Qty: 200 STRIP | Refills: 0 | Status: SHIPPED | OUTPATIENT
Start: 2024-03-07

## 2024-03-07 RX ORDER — ALBUTEROL SULFATE 90 UG/1
AEROSOL, METERED RESPIRATORY (INHALATION)
Qty: 6.7 EACH | Refills: 0 | Status: SHIPPED | OUTPATIENT
Start: 2024-03-07

## 2024-03-22 ENCOUNTER — OFFICE VISIT (OUTPATIENT)
Facility: CLINIC | Age: 59
End: 2024-03-22
Payer: COMMERCIAL

## 2024-03-22 VITALS
WEIGHT: 315 LBS | OXYGEN SATURATION: 99 % | TEMPERATURE: 96.9 F | HEART RATE: 97 BPM | HEIGHT: 74 IN | RESPIRATION RATE: 17 BRPM | SYSTOLIC BLOOD PRESSURE: 167 MMHG | BODY MASS INDEX: 40.43 KG/M2 | DIASTOLIC BLOOD PRESSURE: 125 MMHG

## 2024-03-22 DIAGNOSIS — N18.31 TYPE 2 DIABETES MELLITUS WITH STAGE 3A CHRONIC KIDNEY DISEASE, WITHOUT LONG-TERM CURRENT USE OF INSULIN (HCC): ICD-10-CM

## 2024-03-22 DIAGNOSIS — J44.1 CHRONIC OBSTRUCTIVE PULMONARY DISEASE WITH ACUTE EXACERBATION (HCC): ICD-10-CM

## 2024-03-22 DIAGNOSIS — E78.2 MIXED HYPERLIPIDEMIA: ICD-10-CM

## 2024-03-22 DIAGNOSIS — I10 ESSENTIAL (PRIMARY) HYPERTENSION: Primary | ICD-10-CM

## 2024-03-22 DIAGNOSIS — R06.09 OTHER FORMS OF DYSPNEA: ICD-10-CM

## 2024-03-22 DIAGNOSIS — E11.22 TYPE 2 DIABETES MELLITUS WITH STAGE 3A CHRONIC KIDNEY DISEASE, WITHOUT LONG-TERM CURRENT USE OF INSULIN (HCC): ICD-10-CM

## 2024-03-22 DIAGNOSIS — J45.40 MODERATE PERSISTENT ASTHMA, UNCOMPLICATED: ICD-10-CM

## 2024-03-22 DIAGNOSIS — R35.1 NOCTURIA: ICD-10-CM

## 2024-03-22 DIAGNOSIS — I10 ESSENTIAL (PRIMARY) HYPERTENSION: ICD-10-CM

## 2024-03-22 LAB
25(OH)D3 SERPL-MCNC: 26.4 NG/ML (ref 30–100)
ALBUMIN SERPL-MCNC: 3.8 G/DL (ref 3.5–5)
ALBUMIN/GLOB SERPL: 1 (ref 1.1–2.2)
ALP SERPL-CCNC: 112 U/L (ref 45–117)
ALT SERPL-CCNC: 19 U/L (ref 12–78)
ANION GAP SERPL CALC-SCNC: 4 MMOL/L (ref 5–15)
AST SERPL-CCNC: 10 U/L (ref 15–37)
BASOPHILS # BLD: 0 K/UL (ref 0–0.1)
BASOPHILS NFR BLD: 0 % (ref 0–1)
BILIRUB SERPL-MCNC: 0.4 MG/DL (ref 0.2–1)
BUN SERPL-MCNC: 27 MG/DL (ref 6–20)
BUN/CREAT SERPL: 18 (ref 12–20)
CALCIUM SERPL-MCNC: 9.3 MG/DL (ref 8.5–10.1)
CHLORIDE SERPL-SCNC: 103 MMOL/L (ref 97–108)
CHOLEST SERPL-MCNC: 227 MG/DL
CO2 SERPL-SCNC: 32 MMOL/L (ref 21–32)
CREAT SERPL-MCNC: 1.46 MG/DL (ref 0.7–1.3)
DIFFERENTIAL METHOD BLD: ABNORMAL
EOSINOPHIL # BLD: 0.1 K/UL (ref 0–0.4)
EOSINOPHIL NFR BLD: 1 % (ref 0–7)
ERYTHROCYTE [DISTWIDTH] IN BLOOD BY AUTOMATED COUNT: 15.4 % (ref 11.5–14.5)
EST. AVERAGE GLUCOSE BLD GHB EST-MCNC: 137 MG/DL
GLOBULIN SER CALC-MCNC: 4 G/DL (ref 2–4)
GLUCOSE SERPL-MCNC: 101 MG/DL (ref 65–100)
HBA1C MFR BLD: 6.4 % (ref 4–5.6)
HCT VFR BLD AUTO: 41.9 % (ref 36.6–50.3)
HDLC SERPL-MCNC: 71 MG/DL
HDLC SERPL: 3.2 (ref 0–5)
HGB BLD-MCNC: 13.6 G/DL (ref 12.1–17)
IMM GRANULOCYTES # BLD AUTO: 0 K/UL (ref 0–0.04)
IMM GRANULOCYTES NFR BLD AUTO: 0 % (ref 0–0.5)
LDLC SERPL CALC-MCNC: 139.6 MG/DL (ref 0–100)
LYMPHOCYTES # BLD: 1.5 K/UL (ref 0.8–3.5)
LYMPHOCYTES NFR BLD: 17 % (ref 12–49)
MCH RBC QN AUTO: 26.9 PG (ref 26–34)
MCHC RBC AUTO-ENTMCNC: 32.5 G/DL (ref 30–36.5)
MCV RBC AUTO: 83 FL (ref 80–99)
MONOCYTES # BLD: 0.7 K/UL (ref 0–1)
MONOCYTES NFR BLD: 8 % (ref 5–13)
NEUTS SEG # BLD: 6.5 K/UL (ref 1.8–8)
NEUTS SEG NFR BLD: 74 % (ref 32–75)
NRBC # BLD: 0 K/UL (ref 0–0.01)
NRBC BLD-RTO: 0 PER 100 WBC
NT PRO BNP: 323 PG/ML
PLATELET # BLD AUTO: 279 K/UL (ref 150–400)
PMV BLD AUTO: 11 FL (ref 8.9–12.9)
POTASSIUM SERPL-SCNC: 3.1 MMOL/L (ref 3.5–5.1)
PROT SERPL-MCNC: 7.8 G/DL (ref 6.4–8.2)
PSA SERPL-MCNC: 1.5 NG/ML (ref 0.01–4)
RBC # BLD AUTO: 5.05 M/UL (ref 4.1–5.7)
SODIUM SERPL-SCNC: 139 MMOL/L (ref 136–145)
TRIGL SERPL-MCNC: 82 MG/DL
TSH SERPL DL<=0.05 MIU/L-ACNC: 2.79 UIU/ML (ref 0.36–3.74)
VLDLC SERPL CALC-MCNC: 16.4 MG/DL
WBC # BLD AUTO: 8.8 K/UL (ref 4.1–11.1)

## 2024-03-22 PROCEDURE — 3080F DIAST BP >= 90 MM HG: CPT | Performed by: NURSE PRACTITIONER

## 2024-03-22 PROCEDURE — 3077F SYST BP >= 140 MM HG: CPT | Performed by: NURSE PRACTITIONER

## 2024-03-22 PROCEDURE — 99214 OFFICE O/P EST MOD 30 MIN: CPT | Performed by: NURSE PRACTITIONER

## 2024-03-22 RX ORDER — FINERENONE 10 MG/1
10 TABLET, FILM COATED ORAL DAILY
Qty: 90 TABLET | Refills: 0 | Status: SHIPPED | OUTPATIENT
Start: 2024-03-22

## 2024-03-22 ASSESSMENT — COPD QUESTIONNAIRES
QUESTION2_CHESTPHLEGM: 5
QUESTION8_ENERGYLEVEL: 4
QUESTION5_HOMEACTIVITIES: 4
QUESTION7_SLEEPQUALITY: 5
QUESTION4_WALKINCLINE: 5
QUESTION1_COUGHFREQUENCY: 2
CAT_TOTALSCORE: 30
QUESTION6_LEAVINGHOUSE: 5
QUESTION3_CHESTTIGHTNESS: 0

## 2024-03-22 ASSESSMENT — PATIENT HEALTH QUESTIONNAIRE - PHQ9
SUM OF ALL RESPONSES TO PHQ QUESTIONS 1-9: 0
2. FEELING DOWN, DEPRESSED OR HOPELESS: NOT AT ALL
1. LITTLE INTEREST OR PLEASURE IN DOING THINGS: NOT AT ALL
SUM OF ALL RESPONSES TO PHQ QUESTIONS 1-9: 0
SUM OF ALL RESPONSES TO PHQ QUESTIONS 1-9: 0
SUM OF ALL RESPONSES TO PHQ9 QUESTIONS 1 & 2: 0
SUM OF ALL RESPONSES TO PHQ QUESTIONS 1-9: 0

## 2024-03-22 NOTE — PROGRESS NOTES
Pt is here for   Chief Complaint   Patient presents with    Follow-up     6 months, HTN, DM, CHOL, COPD     1. Have you been to the ER, urgent care clinic since your last visit?  Hospitalized since your last visit?No    2. Have you seen or consulted any other health care providers outside of the [facility] since your last visit?  Include any pap smears or colon screening. No

## 2024-03-22 NOTE — PROGRESS NOTES
Miquel RAMIREZ Page 1965 is a 58 y.o. male, Established patient, here for evaluation of the following chief complaint(s):  Follow-up (6 months, HTN, DM, CHOL, COPD)      ASSESSMENT/PLAN:  Below is the assessment and plan developed based on review of pertinent history, physical exam, labs, studies, and medications.    1. Essential (primary) hypertension  Not at goal, pt did NOT take med today and NOT monitoring at home. Asked to do so and ALWAYS take meds before visits. Pt will work to split up meds instead of taking all in the morning.   - CBC with Auto Differential; Future  - TSH; Future    2. Mixed hyperlipidemia  Stable. Continue statin.   - Lipid Panel; Future    3. Nocturia  New/worse, will monitor. May consider doxazosin or similar if BP not improved at fu and nocturia persist.   - PSA Screening; Future    4. Type 2 diabetes mellitus with stage 3a chronic kidney disease, without long-term current use of insulin (HCC)  Status unknown, labs ordered to monitor. No home accuchecks. Kerendia resent, pt thought it was high OOP cost, but pharmacy reported med not rec'd back in Sept.   - Comprehensive Metabolic Panel; Future  - Hemoglobin A1C; Future  - Finerenone (KERENDIA) 10 MG TABS; Take 10 mg by mouth daily  Dispense: 90 tablet; Refill: 0  - Vitamin D 25 Hydroxy; Future    5. Chronic obstructive pulmonary disease with acute exacerbation (HCC)  Worse, nebulizer ordered for use at home and changed med to STIOLTO.  - DME Order for Nebulizer as OP  - tiotropium-olodaterol (STIOLTO RESPIMAT) 2.5-2.5 MCG/ACT AERS; Inhale 2 puffs into the lungs daily  Dispense: 1 each; Refill: 5  - Brain Natriuretic Peptide; Future            Follow-up and Dispositions    Return for OV 4 wk- HTN, COPD, lab revi.         Pt asked to complete follow by next visit: Call if any problems    SUBJECTIVE/OBJECTIVE:  HPI    Pt presents to f/u HTN, DM, COPD, & CHOL. Feels breathing is worse due to increased coughing and dyspnea. Using spiriva

## 2024-03-25 RX ORDER — POTASSIUM CHLORIDE 1500 MG/1
TABLET, EXTENDED RELEASE ORAL
Qty: 30 TABLET | Refills: 0 | OUTPATIENT
Start: 2024-03-25

## 2024-03-25 RX ORDER — ALBUTEROL SULFATE 90 UG/1
AEROSOL, METERED RESPIRATORY (INHALATION)
Qty: 6.7 EACH | Refills: 0 | OUTPATIENT
Start: 2024-03-25

## 2024-03-27 ENCOUNTER — TELEPHONE (OUTPATIENT)
Facility: CLINIC | Age: 59
End: 2024-03-27

## 2024-03-27 NOTE — TELEPHONE ENCOUNTER
----- Message from Sky Allred sent at 3/27/2024 12:57 PM EDT -----  Subject: Medication Problem     Medication: Finerenone (KERENDIA) 10 MG TABS  Dosage: 10 MG  Ordering Provider:     Question/Problem: pt needs an alternate medication or generic because   insurance wont cover this- please call and triston      Pharmacy: [id number] - [address] - P [phone] - F [phone]    ---------------------------------------------------------------------------  --------------  CALL BACK INFO  [phone]; OK to leave message on voicemail  ---------------------------------------------------------------------------  --------------    SCRIPT ANSWERS  Relationship to Patient: Self

## 2024-03-28 ENCOUNTER — CLINICAL DOCUMENTATION (OUTPATIENT)
Facility: CLINIC | Age: 59
End: 2024-03-28

## 2024-03-28 DIAGNOSIS — R79.89 ELEVATED BRAIN NATRIURETIC PEPTIDE (BNP) LEVEL: Primary | ICD-10-CM

## 2024-03-28 DIAGNOSIS — E87.6 HYPOKALEMIA: ICD-10-CM

## 2024-03-28 DIAGNOSIS — E78.2 MIXED HYPERLIPIDEMIA: ICD-10-CM

## 2024-03-28 DIAGNOSIS — I10 PRIMARY HYPERTENSION: ICD-10-CM

## 2024-03-28 DIAGNOSIS — I10 ESSENTIAL (PRIMARY) HYPERTENSION: ICD-10-CM

## 2024-03-28 RX ORDER — EZETIMIBE 10 MG/1
10 TABLET ORAL DAILY
Qty: 90 TABLET | Refills: 3 | Status: SHIPPED | OUTPATIENT
Start: 2024-03-28

## 2024-03-28 RX ORDER — SPIRONOLACTONE 25 MG/1
25 TABLET ORAL DAILY
Qty: 90 TABLET | Refills: 3 | Status: SHIPPED | OUTPATIENT
Start: 2024-03-28

## 2024-03-28 RX ORDER — ATORVASTATIN CALCIUM 80 MG/1
80 TABLET, FILM COATED ORAL DAILY
Qty: 90 TABLET | Refills: 3 | Status: SHIPPED | OUTPATIENT
Start: 2024-03-28

## 2024-03-28 NOTE — PROGRESS NOTES
Prior authorization approved Case ID: [id number]      Payer: [employer] [facility] - Managed Medicaid   Your PA request has been approved. Additional information will be provided in the approval communication. (Message 1148)   Approval Details    Authorized from March 27, 2024 to March 27, 2025          Finerenone (KERENDIA) 10 MG TABS     Take 10 mg by mouth daily      fair balance

## 2024-04-23 ENCOUNTER — OFFICE VISIT (OUTPATIENT)
Facility: CLINIC | Age: 59
End: 2024-04-23
Payer: COMMERCIAL

## 2024-04-23 VITALS
WEIGHT: 315 LBS | HEART RATE: 85 BPM | DIASTOLIC BLOOD PRESSURE: 111 MMHG | TEMPERATURE: 98 F | BODY MASS INDEX: 40.43 KG/M2 | SYSTOLIC BLOOD PRESSURE: 168 MMHG | HEIGHT: 74 IN | OXYGEN SATURATION: 100 % | RESPIRATION RATE: 18 BRPM

## 2024-04-23 DIAGNOSIS — G47.33 OSA ON CPAP: ICD-10-CM

## 2024-04-23 DIAGNOSIS — J44.1 CHRONIC OBSTRUCTIVE PULMONARY DISEASE WITH ACUTE EXACERBATION (HCC): ICD-10-CM

## 2024-04-23 DIAGNOSIS — I1A.0 RESISTANT HYPERTENSION: Primary | ICD-10-CM

## 2024-04-23 PROBLEM — E66.01 SEVERE OBESITY (HCC): Status: RESOLVED | Noted: 2022-09-13 | Resolved: 2024-04-23

## 2024-04-23 PROCEDURE — 3077F SYST BP >= 140 MM HG: CPT | Performed by: NURSE PRACTITIONER

## 2024-04-23 PROCEDURE — 3080F DIAST BP >= 90 MM HG: CPT | Performed by: NURSE PRACTITIONER

## 2024-04-23 PROCEDURE — 99214 OFFICE O/P EST MOD 30 MIN: CPT | Performed by: NURSE PRACTITIONER

## 2024-04-23 RX ORDER — AMLODIPINE BESYLATE 10 MG/1
10 TABLET ORAL DAILY
Qty: 90 TABLET | Refills: 3 | Status: SHIPPED | OUTPATIENT
Start: 2024-04-23

## 2024-04-23 NOTE — PATIENT INSTRUCTIONS
EXERCISING DAILY for AT LEAST 10 minutes IS extremely IMPORTANT to help manage your wt and blood pressure. Think of it as taking one of your meds. Continuous movement for up to 1 hour is most helpful on DAILY basis. This has to be a NON-NEGOTIABLE. Whether you feel like it or not. This will help with the FATIGUE and overall PAIN. Please give it a try.     Also change up your routine, walking/jogging one day, take an online class, go cycling, go to the gym for a class or get on a machine, go to [facility]. The DAILY MOVEMENT is PARAMOUNT to help LOWER your weight and blood pressure.

## 2024-04-23 NOTE — PROGRESS NOTES
Pt is here for   Chief Complaint   Patient presents with    Follow-up     4 weeks for HTN, COPD, lab review      \"Have you been to the ER, urgent care clinic since your last visit?  Hospitalized since your last visit?\"    NO    “Have you seen or consulted any other health care providers outside of [facility] since your last visit?”    NO        “Have you had a colorectal cancer screening such as a colonoscopy/FIT/Cologuard?    NO    No colonoscopy on file  No cologuard on file  No FIT/FOBT on file   No flexible sigmoidoscopy on file         Click Here for Release of Records Request    
(63626 UT) CAPS capsule Take 1 capsule by mouth once a week    potassium chloride (KLOR-CON M) 10 MEQ extended release tablet Take 3 tablets by mouth daily    olmesartan (BENICAR) 40 MG tablet Take 1 tablet by mouth daily    metFORMIN (GLUCOPHAGE) 500 MG tablet Take 1 tablet by mouth 2 times daily (with meals)    labetalol (NORMODYNE) 300 MG tablet Take 1 tablet by mouth in the morning and 1 tablet at noon and 1 tablet in the evening.    chlorthalidone (HYGROTON) 25 MG tablet Take 1 tablet by mouth daily    blood glucose test strips (ONETOUCH ULTRA) strip Check blood sugar 2 times daily: E11.65, may substitute for insurance preferred strips.    sildenafil (VIAGRA) 50 MG tablet Start with 1/2 tab, by mouth, ~30 min before sex, may take up to 2 tabs to achieve an erection. (Patient not taking: Reported on 4/23/2024)    Lancets MISC Check blood sugar 2 times daily. May substitute for insurance preferred lancets.     No current facility-administered medications for this visit.       Vitals:    04/23/24 1418 04/23/24 1425   BP: (!) 186/118 (!) 168/111   Site: Left Upper Arm Right Upper Arm   Position: Sitting Sitting   Cuff Size: Large Adult Large Adult   Pulse: 85    Resp: 18    Temp: 98 °F (36.7 °C)    TempSrc: Temporal    SpO2: 100%    Weight: (!) 158.8 kg (350 lb)    Height: 1.88 m (6' 2\")        Wt Readings from Last 3 Encounters:   04/23/24 (!) 158.8 kg (350 lb)   03/22/24 (!) 157.4 kg (347 lb)   09/22/23 (!) 148.6 kg (327 lb 9.6 oz)       Physical Exam:   General appearance - alert, well appearing, and in no distress.  Mental status - A/O x 4,normal mood and affect.   Chest -  Symmetric chest rise. No wheezing. No distress.  Heart - Normal rate.  Abdomen- Soft, round. Non-distended, NT. No pulsatile masses or hernias.  Ext-  No pedal edema, clubbing, or cyanosis.  Skin-Warm and dry. No hyperpigmentation, ulcerations, or suspicious lesions.  Neuro - Normal speech, no focal findings or movement disorder. Normal

## 2024-04-29 ENCOUNTER — TELEMEDICINE (OUTPATIENT)
Age: 59
End: 2024-04-29
Payer: COMMERCIAL

## 2024-04-29 DIAGNOSIS — I10 ESSENTIAL (PRIMARY) HYPERTENSION: ICD-10-CM

## 2024-04-29 DIAGNOSIS — G47.33 OBSTRUCTIVE SLEEP APNEA (ADULT) (PEDIATRIC): Primary | ICD-10-CM

## 2024-04-29 DIAGNOSIS — E66.01 OBESITY, MORBID, BMI 40.0-49.9 (HCC): ICD-10-CM

## 2024-04-29 PROCEDURE — 99214 OFFICE O/P EST MOD 30 MIN: CPT | Performed by: INTERNAL MEDICINE

## 2024-04-29 ASSESSMENT — SLEEP AND FATIGUE QUESTIONNAIRES
HOW LIKELY ARE YOU TO NOD OFF OR FALL ASLEEP WHILE WATCHING TV: HIGH CHANCE OF DOZING
HOW LIKELY ARE YOU TO NOD OFF OR FALL ASLEEP WHILE SITTING INACTIVE IN A PUBLIC PLACE: MODERATE CHANCE OF DOZING
HOW LIKELY ARE YOU TO NOD OFF OR FALL ASLEEP WHILE SITTING QUIETLY AFTER LUNCH WITHOUT ALCOHOL: MODERATE CHANCE OF DOZING
HOW LIKELY ARE YOU TO NOD OFF OR FALL ASLEEP WHILE SITTING AND READING: HIGH CHANCE OF DOZING
HOW LIKELY ARE YOU TO NOD OFF OR FALL ASLEEP IN A CAR, WHILE STOPPED FOR A FEW MINUTES IN TRAFFIC: HIGH CHANCE OF DOZING
HOW LIKELY ARE YOU TO NOD OFF OR FALL ASLEEP WHEN YOU ARE A PASSENGER IN A CAR FOR AN HOUR WITHOUT A BREAK: MODERATE CHANCE OF DOZING
HOW LIKELY ARE YOU TO NOD OFF OR FALL ASLEEP WHILE SITTING AND TALKING TO SOMEONE: SLIGHT CHANCE OF DOZING
ESS TOTAL SCORE: 18
HOW LIKELY ARE YOU TO NOD OFF OR FALL ASLEEP WHILE LYING DOWN TO REST IN THE AFTERNOON WHEN CIRCUMSTANCES PERMIT: MODERATE CHANCE OF DOZING

## 2024-04-29 NOTE — PATIENT INSTRUCTIONS
[address]  [place] [address]  Tel.  [phone]  Fax. [phone] [address]  [place] [address]  Tel.  [phone]  Fax. [phone] [address] [place] [address]  Tel.  [phone]  Fax. [phone]     PROPER SLEEP HYGIENE    What to avoid  Do not have drinks with caffeine, such as coffee or black tea, for 8 hours before bed.  Do not smoke or use other types of tobacco near bedtime. Nicotine is a stimulant and can keep you awake.  Avoid drinking alcohol late in the evening, because it can cause you to wake in the middle of the night.  Do not eat a big meal close to bedtime. If you are hungry, eat a light snack.  Do not drink a lot of water close to bedtime, because the need to urinate may wake you up during the night.  Do not read or watch TV in bed. Use the bed only for sleeping and sexual activity.  What to try  Go to bed at the same time every night, and wake up at the same time every morning. Do not take naps during the day.  Keep your bedroom quiet, dark, and cool.  Get regular exercise, but not within 3 to 4 hours of your bedtime..  Sleep on a comfortable pillow and mattress.  If watching the clock makes you anxious, turn it facing away from you so you cannot see the time.  If you worry when you lie down, start a worry book. Well before bedtime, write down your worries, and then set the book and your concerns aside.  Try meditation or other relaxation techniques before you go to bed.  If you cannot fall asleep, get up and go to another room until you feel sleepy. Do something relaxing. Repeat your bedtime routine before you go to bed again.  Make your house quiet and calm about an hour before bedtime. Turn down the lights, turn off the TV, log off the computer, and turn down the volume on music. This can help you relax after a busy day.    Drowsy Driving  The [place] National Highway Traffic Safety Administration cites drowsiness as a

## 2024-04-29 NOTE — PROGRESS NOTES
Miquel Castillo, was evaluated through a synchronous (real-time) audio-video encounter. The patient (or guardian if applicable) is aware that this is a billable service, which includes applicable co-pays. This Virtual Visit was conducted with patient's (and/or legal guardian's) consent. Patient identification was verified, and a caregiver was present when appropriate.   The patient was located at Home: [address] [place] [id number]  Provider was located at Facility (Appt Dept): [address]  [place] [address]  Confirm you are appropriately licensed, registered, or certified to deliver care in the state where the patient is located as indicated above. If you are not or unsure, please re-schedule the visit: Yes, I confirm.      Total time spent for this encounter: Not billed by time    --Kerri Moran MD on 4/29/2024 at 10:44 AM    An electronic signature was used to authenticate this note.'                    [address]  [place] [address]  Tel.  [phone]  Fax. [phone] [address]  [place] [address]  Tel.  [phone]  Fax. [phone] [address] [place] [address]  Tel.  [phone]  Fax. [phone]       Telemedicine visit performed with verbal consent of the patient.  Patient called and identity confirmed with 2 patient identifers          S>Miquel Castillo is a 58 y.o. male seen at this telemedicine visit for a positive airway pressure follow-up.  He reports significant problems using the device.  He is not compliant over the past 90 days.  The following problems are identified:    Drowsiness yes Problems exhaling no   Snoring yes Forget to put on Yes-wore just 2 days but could not get comfortable wearing mask   Mask Comfortable no Can't fall asleep no   Dry Mouth no Mask falls off no   Air Leaking no Frequent awakenings no       Download reviewed 360 days ending 2-22-24 (no data since) (2 days used)  Estimated AHI

## 2024-05-01 ENCOUNTER — TELEPHONE (OUTPATIENT)
Age: 59
End: 2024-05-01

## 2024-05-01 NOTE — TELEPHONE ENCOUNTER
Patient is calling because he would like to cancel his appointment for today.Patient would like to be called back to reschedule his appointment with .    [phone]

## 2024-05-08 ENCOUNTER — OFFICE VISIT (OUTPATIENT)
Age: 59
End: 2024-05-08
Payer: COMMERCIAL

## 2024-05-08 VITALS
HEIGHT: 74 IN | OXYGEN SATURATION: 93 % | HEART RATE: 97 BPM | WEIGHT: 315 LBS | SYSTOLIC BLOOD PRESSURE: 162 MMHG | BODY MASS INDEX: 40.43 KG/M2 | DIASTOLIC BLOOD PRESSURE: 98 MMHG

## 2024-05-08 DIAGNOSIS — R60.0 BILATERAL LEG EDEMA: ICD-10-CM

## 2024-05-08 DIAGNOSIS — E66.01 MORBID OBESITY (HCC): ICD-10-CM

## 2024-05-08 DIAGNOSIS — N28.9 RENAL IMPAIRMENT: ICD-10-CM

## 2024-05-08 DIAGNOSIS — G47.33 OSA ON CPAP: ICD-10-CM

## 2024-05-08 DIAGNOSIS — I10 PRIMARY HYPERTENSION: Primary | ICD-10-CM

## 2024-05-08 DIAGNOSIS — R06.02 SHORTNESS OF BREATH: ICD-10-CM

## 2024-05-08 DIAGNOSIS — E78.2 MIXED HYPERLIPIDEMIA: ICD-10-CM

## 2024-05-08 DIAGNOSIS — J45.40 MODERATE PERSISTENT ASTHMA, UNCOMPLICATED: ICD-10-CM

## 2024-05-08 DIAGNOSIS — R06.09 OTHER FORMS OF DYSPNEA: ICD-10-CM

## 2024-05-08 PROCEDURE — 99204 OFFICE O/P NEW MOD 45 MIN: CPT | Performed by: SPECIALIST

## 2024-05-08 PROCEDURE — 3077F SYST BP >= 140 MM HG: CPT | Performed by: SPECIALIST

## 2024-05-08 PROCEDURE — 3080F DIAST BP >= 90 MM HG: CPT | Performed by: SPECIALIST

## 2024-05-08 RX ORDER — AMLODIPINE BESYLATE 5 MG/1
5 TABLET ORAL DAILY
COMMUNITY
Start: 2024-04-23 | End: 2024-05-08

## 2024-05-08 RX ORDER — ATORVASTATIN CALCIUM 20 MG/1
20 TABLET, FILM COATED ORAL DAILY
COMMUNITY
Start: 2024-04-09

## 2024-05-08 RX ORDER — ALBUTEROL SULFATE 90 UG/1
AEROSOL, METERED RESPIRATORY (INHALATION)
Qty: 6.7 EACH | Refills: 0 | Status: SHIPPED | OUTPATIENT
Start: 2024-05-08

## 2024-05-08 ASSESSMENT — PATIENT HEALTH QUESTIONNAIRE - PHQ9
SUM OF ALL RESPONSES TO PHQ9 QUESTIONS 1 & 2: 0
SUM OF ALL RESPONSES TO PHQ QUESTIONS 1-9: 0
SUM OF ALL RESPONSES TO PHQ QUESTIONS 1-9: 0
1. LITTLE INTEREST OR PLEASURE IN DOING THINGS: NOT AT ALL
SUM OF ALL RESPONSES TO PHQ QUESTIONS 1-9: 0
SUM OF ALL RESPONSES TO PHQ QUESTIONS 1-9: 0
2. FEELING DOWN, DEPRESSED OR HOPELESS: NOT AT ALL

## 2024-05-08 NOTE — PROGRESS NOTES
HISTORY OF PRESENT ILLNESS  Miquel Castillo is a 58 y.o. male     SUMMARY:   Patient Active Problem List   Diagnosis    HTN (hypertension)    Right sided sciatica    Dyspnea on exertion    DIMITRI on CPAP    Moderate persistent reactive airway disease without complication    Metabolic syndrome    Mixed hyperlipidemia    Diuretic-induced hypokalemia    Elevated TSH    Renal impairment    Type 2 diabetes mellitus without complication, without long-term current use of insulin ([facility])    BMI 40.0-44.9, adult ([facility])            CARDIOLOGY STUDIES TO DATE:  No specialty comments available.    Chief Complaint   Patient presents with    Hypertension       HPI :  He is referred by his primary care for evaluation of high blood pressure.  He has had a long history of hypertension and intermittent compliance with his medication.  Most recently he has not been taking labetalol 3 times a day because he takes it with metformin and it sounds like the metformin is causing diarrhea so I explained to him it is not the labetalol, is probably the metformin.  When he saw his primary care a couple weeks ago his amlodipine was increased to 10 mg a day and spironolactone was added.  Also his Lipitor was increased to 80 mg a day but he has not started that yet.  He has untreated sleep apnea but is in the process of getting his CPAP adjusted.  He is morbidly obese and has diabetes and hyperlipidemia.  He is a past smoker and family history is negative for premature coronary disease.  He has chronic dyspnea on exertion said to be due to COPD.     CARDIAC ROS:   negative for chest pain, claudication, irregular heart beat, orthopnea, paroxysmal nocturnal dyspnea, and syncope    Family History   Problem Relation Age of Onset    Hypertension Mother     Heart Disease Father 65        cabg    Hypertension Father        Past Medical History:   Diagnosis Date    HTN (hypertension)     Hypokalemia        Specialty Problems          Cardiology Problems

## 2024-05-08 NOTE — TELEPHONE ENCOUNTER
Last appointment: 04/23/2024 TREMAINE Mckinnon   Next appointment: 06/28/2024 TREMAINE Mckinnon   Previous refill encounter(s):   03/07/2024 Albuterol HFA INH #6.7 grams     For Pharmacy Admin Tracking Only    Program: Medication Refill  Intervention Detail: New Rx: 1, reason: Patient Preference  Time Spent (min): 5    Requested Prescriptions     Pending Prescriptions Disp Refills    albuterol sulfate HFA (PROVENTIL;VENTOLIN;PROAIR) 108 (90 Base) MCG/ACT inhaler 6.7 each 0     Sig: INHALE 2 PUFFS BY MOUTH EVERY 4 HOURS AS NEEDED FOR WHEEZING OR SHORTNESS OF BREATH, PERSISTENT COUGHING.

## 2024-05-14 ENCOUNTER — HOSPITAL ENCOUNTER (OUTPATIENT)
Facility: HOSPITAL | Age: 59
Discharge: HOME | End: 2024-05-16
Attending: SPECIALIST
Payer: COMMERCIAL

## 2024-05-14 DIAGNOSIS — R06.02 SHORTNESS OF BREATH: ICD-10-CM

## 2024-05-14 PROCEDURE — 93306 TTE W/DOPPLER COMPLETE: CPT

## 2024-05-15 LAB
ECHO AO ROOT DIAM: 2.7 CM
ECHO AV AREA PEAK VELOCITY: 3.8 CM2
ECHO AV AREA PLAN: 3.4 CM2
ECHO AV PEAK GRADIENT: 7 MMHG
ECHO AV PEAK VELOCITY: 1.3 M/S
ECHO AV VELOCITY RATIO: 0.77
ECHO LA DIAMETER: 3.9 CM
ECHO LA TO AORTIC ROOT RATIO: 1.44
ECHO LA VOL A-L A4C: 101 ML (ref 18–58)
ECHO LA VOL MOD A4C: 94 ML (ref 18–58)
ECHO LV EDV A4C: 186 ML
ECHO LV EJECTION FRACTION A4C: 55 %
ECHO LV ESV A4C: 83 ML
ECHO LV FRACTIONAL SHORTENING: 37 % (ref 28–44)
ECHO LV INTERNAL DIMENSION DIASTOLIC: 6.2 CM (ref 4.2–5.9)
ECHO LV INTERNAL DIMENSION SYSTOLIC: 3.9 CM
ECHO LV IVSD: 1.4 CM (ref 0.6–1)
ECHO LV MASS 2D: 429.3 G (ref 88–224)
ECHO LV POSTERIOR WALL DIASTOLIC: 1.5 CM (ref 0.6–1)
ECHO LV RELATIVE WALL THICKNESS RATIO: 0.48
ECHO LVOT AREA: 4.9 CM2
ECHO LVOT DIAM: 2.5 CM
ECHO LVOT PEAK GRADIENT: 4 MMHG
ECHO LVOT PEAK VELOCITY: 1 M/S
ECHO MV A VELOCITY: 0.66 M/S
ECHO MV AREA PHT: 3.2 CM2
ECHO MV E DECELERATION TIME (DT): 240.2 MS
ECHO MV E VELOCITY: 0.44 M/S
ECHO MV E/A RATIO: 0.67
ECHO MV MAX VELOCITY: 0.7 M/S
ECHO MV MEAN GRADIENT: 1 MMHG
ECHO MV MEAN VELOCITY: 0.5 M/S
ECHO MV PEAK GRADIENT: 2 MMHG
ECHO MV PRESSURE HALF TIME (PHT): 69.7 MS
ECHO MV VTI: 14.2 CM
ECHO PV MAX VELOCITY: 0.8 M/S
ECHO PV PEAK GRADIENT: 2 MMHG
ECHO TV REGURGITANT MAX VELOCITY: 1.31 M/S
ECHO TV REGURGITANT PEAK GRADIENT: 7 MMHG

## 2024-05-16 ENCOUNTER — TELEPHONE (OUTPATIENT)
Age: 59
End: 2024-05-16

## 2024-05-16 NOTE — TELEPHONE ENCOUNTER
----- Message from Sergio Green III, MD sent at 5/16/2024  8:41 AM EDT -----  Heart muscle is strong and valves all ok. Looks great

## 2024-05-16 NOTE — RESULT ENCOUNTER NOTE
Spoke with patient after 2 identifiers to review test results/recommendations.  Pt verbalized understanding with no further questions.

## 2024-06-26 DIAGNOSIS — J45.40 MODERATE PERSISTENT ASTHMA, UNCOMPLICATED: ICD-10-CM

## 2024-06-26 DIAGNOSIS — R06.09 OTHER FORMS OF DYSPNEA: ICD-10-CM

## 2024-06-27 RX ORDER — ALBUTEROL SULFATE 90 UG/1
AEROSOL, METERED RESPIRATORY (INHALATION)
Qty: 6.7 EACH | Refills: 0 | Status: SHIPPED | OUTPATIENT
Start: 2024-06-27

## 2024-07-02 RX ORDER — POTASSIUM CHLORIDE 1500 MG/1
TABLET, EXTENDED RELEASE ORAL
Qty: 30 TABLET | Refills: 0 | OUTPATIENT
Start: 2024-07-02

## 2024-07-03 ENCOUNTER — OFFICE VISIT (OUTPATIENT)
Age: 59
End: 2024-07-03
Payer: COMMERCIAL

## 2024-07-03 VITALS
HEART RATE: 87 BPM | BODY MASS INDEX: 40.43 KG/M2 | OXYGEN SATURATION: 94 % | DIASTOLIC BLOOD PRESSURE: 112 MMHG | HEIGHT: 74 IN | SYSTOLIC BLOOD PRESSURE: 176 MMHG | WEIGHT: 315 LBS

## 2024-07-03 DIAGNOSIS — R60.0 BILATERAL LEG EDEMA: ICD-10-CM

## 2024-07-03 DIAGNOSIS — E78.2 MIXED HYPERLIPIDEMIA: ICD-10-CM

## 2024-07-03 DIAGNOSIS — R06.02 SHORTNESS OF BREATH: ICD-10-CM

## 2024-07-03 DIAGNOSIS — I10 PRIMARY HYPERTENSION: Primary | ICD-10-CM

## 2024-07-03 DIAGNOSIS — G47.33 OSA ON CPAP: ICD-10-CM

## 2024-07-03 DIAGNOSIS — E66.01 MORBID OBESITY (HCC): ICD-10-CM

## 2024-07-03 PROCEDURE — 99214 OFFICE O/P EST MOD 30 MIN: CPT | Performed by: SPECIALIST

## 2024-07-03 PROCEDURE — 3077F SYST BP >= 140 MM HG: CPT | Performed by: SPECIALIST

## 2024-07-03 PROCEDURE — 3080F DIAST BP >= 90 MM HG: CPT | Performed by: SPECIALIST

## 2024-07-03 RX ORDER — ATORVASTATIN CALCIUM 80 MG/1
80 TABLET, FILM COATED ORAL NIGHTLY
COMMUNITY
Start: 2024-06-26

## 2024-07-03 ASSESSMENT — PATIENT HEALTH QUESTIONNAIRE - PHQ9
SUM OF ALL RESPONSES TO PHQ QUESTIONS 1-9: 0
SUM OF ALL RESPONSES TO PHQ QUESTIONS 1-9: 0
1. LITTLE INTEREST OR PLEASURE IN DOING THINGS: NOT AT ALL
SUM OF ALL RESPONSES TO PHQ QUESTIONS 1-9: 0
SUM OF ALL RESPONSES TO PHQ QUESTIONS 1-9: 0
SUM OF ALL RESPONSES TO PHQ9 QUESTIONS 1 & 2: 0
2. FEELING DOWN, DEPRESSED OR HOPELESS: NOT AT ALL

## 2024-07-03 NOTE — PROGRESS NOTES
HISTORY OF PRESENT ILLNESS  Miquel Castillo is a 59 y.o. male     SUMMARY:   Patient Active Problem List   Diagnosis    HTN (hypertension)    Right sided sciatica    Dyspnea on exertion    DIMITRI on CPAP    Moderate persistent reactive airway disease without complication    Metabolic syndrome    Mixed hyperlipidemia    Diuretic-induced hypokalemia    Elevated TSH    Renal impairment    Type 2 diabetes mellitus without complication, without long-term current use of insulin (HCC)    BMI 40.0-44.9, adult ([facility])            CARDIOLOGY STUDIES TO DATE:  5/24 mod lvh, otherwise normal echo    Chief Complaint   Patient presents with    Hypertension       HPI :  His blood pressure is still elevated but it is better at home than it is here in the office today, and part of the reason is that he had run out of his 300 mg of labetalol so he only took 100 mg tablet that he had this morning.  He has met with the sleep doctors and is waiting for a titration but has not heard back from them yet.  His shortness of breath and leg edema are better since he has been walking and is actually lost about 8 pounds since we last met.  He is due to see his primary care soon for complete blood work.    CARDIAC ROS:   negative for chest pain, irregular heart beat, orthopnea, paroxysmal nocturnal dyspnea, and syncope    Family History   Problem Relation Age of Onset    Hypertension Mother     Heart Disease Father 65        cabg    Hypertension Father        Past Medical History:   Diagnosis Date    HTN (hypertension)     Hypokalemia        Specialty Problems          Cardiology Problems    HTN (hypertension)        Mixed hyperlipidemia            GENERAL ROS:  A comprehensive review of systems was negative except for what was noted in the HPI.  BP (!) 176/112 (Site: Left Upper Arm, Position: Sitting, Cuff Size: Large Adult)   Pulse 87   Ht 1.88 m (6' 2\")   Wt (!) 155.1 kg (342 lb)   SpO2 94%   BMI 43.91 kg/m²     Wt Readings from Last 3

## 2024-07-23 ENCOUNTER — OFFICE VISIT (OUTPATIENT)
Facility: CLINIC | Age: 59
End: 2024-07-23
Payer: COMMERCIAL

## 2024-07-23 VITALS
HEIGHT: 74 IN | TEMPERATURE: 98.5 F | SYSTOLIC BLOOD PRESSURE: 136 MMHG | HEART RATE: 89 BPM | WEIGHT: 315 LBS | DIASTOLIC BLOOD PRESSURE: 97 MMHG | RESPIRATION RATE: 18 BRPM | OXYGEN SATURATION: 100 % | BODY MASS INDEX: 40.43 KG/M2

## 2024-07-23 DIAGNOSIS — I10 ESSENTIAL (PRIMARY) HYPERTENSION: Primary | ICD-10-CM

## 2024-07-23 DIAGNOSIS — I10 ESSENTIAL (PRIMARY) HYPERTENSION: ICD-10-CM

## 2024-07-23 DIAGNOSIS — E78.2 MIXED HYPERLIPIDEMIA: ICD-10-CM

## 2024-07-23 DIAGNOSIS — N18.31 TYPE 2 DIABETES MELLITUS WITH STAGE 3A CHRONIC KIDNEY DISEASE, WITHOUT LONG-TERM CURRENT USE OF INSULIN (HCC): ICD-10-CM

## 2024-07-23 DIAGNOSIS — E11.22 TYPE 2 DIABETES MELLITUS WITH STAGE 3A CHRONIC KIDNEY DISEASE, WITHOUT LONG-TERM CURRENT USE OF INSULIN (HCC): ICD-10-CM

## 2024-07-23 DIAGNOSIS — T50.2X5A DIURETIC-INDUCED HYPOKALEMIA: ICD-10-CM

## 2024-07-23 DIAGNOSIS — E87.6 DIURETIC-INDUCED HYPOKALEMIA: ICD-10-CM

## 2024-07-23 PROCEDURE — 99214 OFFICE O/P EST MOD 30 MIN: CPT | Performed by: NURSE PRACTITIONER

## 2024-07-23 PROCEDURE — 3044F HG A1C LEVEL LT 7.0%: CPT | Performed by: NURSE PRACTITIONER

## 2024-07-23 PROCEDURE — 3075F SYST BP GE 130 - 139MM HG: CPT | Performed by: NURSE PRACTITIONER

## 2024-07-23 PROCEDURE — 3080F DIAST BP >= 90 MM HG: CPT | Performed by: NURSE PRACTITIONER

## 2024-07-23 NOTE — PATIENT INSTRUCTIONS
Please check with your pharmacy to find out WHY they are NOT giving you the LABETALOL.    Call sleep med to find out about the PAP Titration and schedule a follow up with Dr. Moran after that is done.    Be sure to keep a log of your blood glucose and blood pressure levels for review at each appt.       BEET ROOT juice or powder to help lower your blood pressure naturally. This can change the color of your urine or stool to red, so don't be alarmed if this happens. It is normal.

## 2024-07-23 NOTE — PROGRESS NOTES
7/3/2024    12:47 PM 5/8/2024     1:38 PM 4/23/2024     2:18 PM 3/22/2024    11:35 AM 9/22/2023    11:31 AM 7/6/2023    10:09 AM   Weight Metrics   Weight 345 lb 342 lb 350 lb 350 lb 347 lb 327 lb 9.6 oz 318 lb   BMI (Calculated) 44.4 kg/m2 44 kg/m2 45 kg/m2 45 kg/m2 44.6 kg/m2 42.1 kg/m2 40.9 kg/m2         Review of Systems  Constitutional: negative for fevers, chills, anorexia and weight loss  Respiratory:  negative for cough, hemoptysis, dyspnea, and wheezing  CV:   negative for chest pain, palpitations, and lower extremity edema  GI:   negative for nausea, vomiting, diarrhea, abdominal pain, and melena  Endo:               negative for polyuria,polydipsia,polyphagia, and heat intolerance  Genitourinary: negative for frequency, urgency, dysuria, retention, and hematuria  Integument:  negative for rash, ulcerations, and pruritus  Hematologic:  negative for easy bruising and bleeding  Musculoskel: negative for arthralgias, muscle weakness,and joint pain/swelling  Neurological:  negative for headaches, dizziness, vertigo,and memory/gait problems  Behavl/Psych: negative for feelings of anxiety, depression, mood changes, and suicidal ideation    Past Medical History:   Diagnosis Date    HTN (hypertension)     Hypokalemia        Past Surgical History:   Procedure Laterality Date    PRE-MALIGNANT / BENIGN SKIN LESION EXCISION      cyst removal on scalp       Current Outpatient Medications   Medication Sig    atorvastatin (LIPITOR) 80 MG tablet Take 1 tablet by mouth at bedtime    amLODIPine (NORVASC) 10 MG tablet Take 1 tablet by mouth daily    ezetimibe (ZETIA) 10 MG tablet Take 1 tablet by mouth daily Take with your STATIN    spironolactone (ALDACTONE) 25 MG tablet Take 1 tablet by mouth daily    Finerenone (KERENDIA) 10 MG TABS Take 10 mg by mouth daily    tiotropium-olodaterol (STIOLTO RESPIMAT) 2.5-2.5 MCG/ACT AERS Inhale 2 puffs into the lungs daily    SPIRIVA HANDIHALER 18 MCG inhalation capsule INHALE 1 CAPSULE

## 2024-07-24 LAB
ANION GAP SERPL CALC-SCNC: 9 MMOL/L (ref 5–15)
BUN SERPL-MCNC: 35 MG/DL (ref 6–20)
BUN/CREAT SERPL: 20 (ref 12–20)
CALCIUM SERPL-MCNC: 9.9 MG/DL (ref 8.5–10.1)
CHLORIDE SERPL-SCNC: 108 MMOL/L (ref 97–108)
CHOLEST SERPL-MCNC: 152 MG/DL
CO2 SERPL-SCNC: 24 MMOL/L (ref 21–32)
CREAT SERPL-MCNC: 1.72 MG/DL (ref 0.7–1.3)
EST. AVERAGE GLUCOSE BLD GHB EST-MCNC: 137 MG/DL
GLUCOSE SERPL-MCNC: 96 MG/DL (ref 65–100)
HBA1C MFR BLD: 6.4 % (ref 4–5.6)
HDLC SERPL-MCNC: 43 MG/DL
HDLC SERPL: 3.5 (ref 0–5)
LDLC SERPL CALC-MCNC: 79.4 MG/DL (ref 0–100)
POTASSIUM SERPL-SCNC: 3.8 MMOL/L (ref 3.5–5.1)
SODIUM SERPL-SCNC: 141 MMOL/L (ref 136–145)
TRIGL SERPL-MCNC: 148 MG/DL
VLDLC SERPL CALC-MCNC: 29.6 MG/DL

## 2024-07-24 NOTE — RESULT ENCOUNTER NOTE
Overall your labs look good. GREAT JOB lowering your CHOLESTEROL, it looks amazing, but...    Your renal function has worsened, this is likely due to the poor blood pressure control, as your A1C is at goal < 7 still. We will continue to monitor this for now. If it worsens any more than I will refer you to a kidney specialist. This is to be proactive and avoid the need for dialysis in the future.

## 2024-07-30 DIAGNOSIS — I10 PRIMARY HYPERTENSION: ICD-10-CM

## 2024-07-30 DIAGNOSIS — E11.22 TYPE 2 DIABETES MELLITUS WITH STAGE 3A CHRONIC KIDNEY DISEASE, WITHOUT LONG-TERM CURRENT USE OF INSULIN (HCC): ICD-10-CM

## 2024-07-30 DIAGNOSIS — R06.09 OTHER FORMS OF DYSPNEA: ICD-10-CM

## 2024-07-30 DIAGNOSIS — E11.9 TYPE 2 DIABETES MELLITUS WITHOUT COMPLICATIONS (HCC): ICD-10-CM

## 2024-07-30 DIAGNOSIS — N18.31 TYPE 2 DIABETES MELLITUS WITH STAGE 3A CHRONIC KIDNEY DISEASE, WITHOUT LONG-TERM CURRENT USE OF INSULIN (HCC): ICD-10-CM

## 2024-07-30 DIAGNOSIS — J45.40 MODERATE PERSISTENT ASTHMA, UNCOMPLICATED: ICD-10-CM

## 2024-07-31 DIAGNOSIS — N18.31 TYPE 2 DIABETES MELLITUS WITH STAGE 3A CHRONIC KIDNEY DISEASE, WITHOUT LONG-TERM CURRENT USE OF INSULIN (HCC): Primary | ICD-10-CM

## 2024-07-31 DIAGNOSIS — E11.22 TYPE 2 DIABETES MELLITUS WITH STAGE 3A CHRONIC KIDNEY DISEASE, WITHOUT LONG-TERM CURRENT USE OF INSULIN (HCC): Primary | ICD-10-CM

## 2024-07-31 RX ORDER — FINERENONE 10 MG/1
1 TABLET, FILM COATED ORAL DAILY
Qty: 30 TABLET | Refills: 2 | OUTPATIENT
Start: 2024-07-31

## 2024-07-31 RX ORDER — ALBUTEROL SULFATE 90 UG/1
AEROSOL, METERED RESPIRATORY (INHALATION)
Qty: 6.7 EACH | Refills: 0 | Status: SHIPPED | OUTPATIENT
Start: 2024-07-31

## 2024-07-31 RX ORDER — CHLORTHALIDONE 25 MG/1
25 TABLET ORAL DAILY
Qty: 90 TABLET | Refills: 3 | Status: SHIPPED | OUTPATIENT
Start: 2024-07-31

## 2024-07-31 RX ORDER — FINERENONE 20 MG/1
20 TABLET, FILM COATED ORAL DAILY
Qty: 90 TABLET | Refills: 1 | Status: SHIPPED | OUTPATIENT
Start: 2024-07-31

## 2024-08-01 RX ORDER — BLOOD SUGAR DIAGNOSTIC
STRIP MISCELLANEOUS
Qty: 90 STRIP | Refills: 3 | Status: SHIPPED | OUTPATIENT
Start: 2024-08-01

## 2024-08-01 RX ORDER — POTASSIUM CHLORIDE 1500 MG/1
TABLET, EXTENDED RELEASE ORAL
Qty: 90 TABLET | Refills: 3 | Status: SHIPPED | OUTPATIENT
Start: 2024-08-01

## 2024-09-04 ENCOUNTER — OFFICE VISIT (OUTPATIENT)
Age: 59
End: 2024-09-04
Payer: COMMERCIAL

## 2024-09-04 VITALS
WEIGHT: 315 LBS | HEIGHT: 74 IN | SYSTOLIC BLOOD PRESSURE: 108 MMHG | BODY MASS INDEX: 40.43 KG/M2 | HEART RATE: 71 BPM | DIASTOLIC BLOOD PRESSURE: 66 MMHG | OXYGEN SATURATION: 92 %

## 2024-09-04 DIAGNOSIS — E66.01 MORBID OBESITY (HCC): ICD-10-CM

## 2024-09-04 DIAGNOSIS — E78.2 MIXED HYPERLIPIDEMIA: ICD-10-CM

## 2024-09-04 DIAGNOSIS — R60.0 BILATERAL LEG EDEMA: ICD-10-CM

## 2024-09-04 DIAGNOSIS — G47.33 OSA ON CPAP: ICD-10-CM

## 2024-09-04 DIAGNOSIS — I10 PRIMARY HYPERTENSION: Primary | ICD-10-CM

## 2024-09-04 PROCEDURE — 99214 OFFICE O/P EST MOD 30 MIN: CPT | Performed by: SPECIALIST

## 2024-09-04 PROCEDURE — 3074F SYST BP LT 130 MM HG: CPT | Performed by: SPECIALIST

## 2024-09-04 PROCEDURE — 3078F DIAST BP <80 MM HG: CPT | Performed by: SPECIALIST

## 2024-09-04 ASSESSMENT — PATIENT HEALTH QUESTIONNAIRE - PHQ9
2. FEELING DOWN, DEPRESSED OR HOPELESS: NOT AT ALL
1. LITTLE INTEREST OR PLEASURE IN DOING THINGS: NOT AT ALL
SUM OF ALL RESPONSES TO PHQ9 QUESTIONS 1 & 2: 0
SUM OF ALL RESPONSES TO PHQ QUESTIONS 1-9: 0

## 2024-09-04 NOTE — PROGRESS NOTES
HISTORY OF PRESENT ILLNESS  Miquel Castillo is a 59 y.o. male     SUMMARY:   Patient Active Problem List   Diagnosis    HTN (hypertension)    Right sided sciatica    Dyspnea on exertion    DIMITRI on CPAP    Moderate persistent reactive airway disease without complication    Metabolic syndrome    Mixed hyperlipidemia    Diuretic-induced hypokalemia    Elevated TSH    Renal impairment    Type 2 diabetes mellitus without complication, without long-term current use of insulin (HCC)    BMI 40.0-44.9, adult ([facility])            CARDIOLOGY STUDIES TO DATE:  5/24 mod lvh, otherwise normal echo    Chief Complaint   Patient presents with    Hypertension       HPI :  He is doing really well.  He is now faithfully taking his medications as prescribed.  He is cut back on drinking and he is wearing his CPAP.  Blood pressure is excellent and his weight is actually down about 14 pounds.  Most recent lipid profile looked great.  Most recent electrolytes looked good except his creatinine had bumped up from about 1.4-1.72.    CARDIAC ROS:   negative for chest pain, claudication, dyspnea, irregular heart beat, lower extremity edema, orthopnea, paroxysmal nocturnal dyspnea, and syncope    Family History   Problem Relation Age of Onset    Hypertension Mother     Heart Disease Father 65        cabg    Hypertension Father        Past Medical History:   Diagnosis Date    HTN (hypertension)     Hypokalemia        Specialty Problems          Cardiology Problems    HTN (hypertension)        Mixed hyperlipidemia            GENERAL ROS:  A comprehensive review of systems was negative except for what was noted in the HPI.  /66 (Site: Left Upper Arm, Position: Sitting, Cuff Size: Large Adult)   Pulse 71   Ht 1.88 m (6' 2\")   Wt (!) 149.2 kg (329 lb)   SpO2 92%   BMI 42.24 kg/m²     Wt Readings from Last 3 Encounters:   09/04/24 (!) 149.2 kg (329 lb)   07/23/24 (!) 156.5 kg (345 lb)   07/03/24 (!) 155.1 kg (342 lb)            BP

## 2024-09-16 ENCOUNTER — HOSPITAL ENCOUNTER (EMERGENCY)
Facility: HOSPITAL | Age: 59
Discharge: HOME | End: 2024-09-16
Payer: COMMERCIAL

## 2024-09-16 ENCOUNTER — APPOINTMENT (OUTPATIENT)
Facility: HOSPITAL | Age: 59
End: 2024-09-16
Payer: COMMERCIAL

## 2024-09-16 VITALS
SYSTOLIC BLOOD PRESSURE: 160 MMHG | DIASTOLIC BLOOD PRESSURE: 92 MMHG | HEART RATE: 79 BPM | OXYGEN SATURATION: 95 % | HEIGHT: 74 IN | BODY MASS INDEX: 40.43 KG/M2 | WEIGHT: 315 LBS | RESPIRATION RATE: 19 BRPM | TEMPERATURE: 97.6 F

## 2024-09-16 DIAGNOSIS — M19.011 OSTEOARTHRITIS OF RIGHT SHOULDER, UNSPECIFIED OSTEOARTHRITIS TYPE: ICD-10-CM

## 2024-09-16 DIAGNOSIS — M47.816 SPONDYLOSIS OF LUMBAR REGION WITHOUT MYELOPATHY OR RADICULOPATHY: ICD-10-CM
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
## 2024-09-16 DIAGNOSIS — S22.41XA CLOSED FRACTURE OF MULTIPLE RIBS OF RIGHT SIDE, INITIAL ENCOUNTER: ICD-10-CM

## 2024-09-16 DIAGNOSIS — S09.90XA INJURY OF HEAD, INITIAL ENCOUNTER: ICD-10-CM

## 2024-09-16 PROCEDURE — 73030 X-RAY EXAM OF SHOULDER: CPT

## 2024-09-16 PROCEDURE — 6370000000 HC RX 637 (ALT 250 FOR IP): Performed by: NURSE PRACTITIONER

## 2024-09-16 PROCEDURE — 71101 X-RAY EXAM UNILAT RIBS/CHEST: CPT

## 2024-09-16 PROCEDURE — 72100 X-RAY EXAM L-S SPINE 2/3 VWS: CPT

## 2024-09-16 PROCEDURE — 70450 CT HEAD/BRAIN W/O DYE: CPT

## 2024-09-16 PROCEDURE — 99284 EMERGENCY DEPT VISIT MOD MDM: CPT

## 2024-09-16 RX ORDER — HYDROCODONE BITARTRATE AND ACETAMINOPHEN 5; 325 MG/1; MG/1
1 TABLET ORAL
Status: COMPLETED | OUTPATIENT
Start: 2024-09-16 | End: 2024-09-16

## 2024-09-16 RX ORDER — METHOCARBAMOL 750 MG/1
750 TABLET, FILM COATED ORAL 4 TIMES DAILY
Qty: 40 TABLET | Refills: 0 | Status: SHIPPED | OUTPATIENT
Start: 2024-09-16 | End: 2024-09-26

## 2024-09-16 RX ORDER — METHOCARBAMOL 500 MG/1
500 TABLET, FILM COATED ORAL ONCE
Status: COMPLETED | OUTPATIENT
Start: 2024-09-16 | End: 2024-09-16

## 2024-09-16 RX ORDER — HYDROCODONE BITARTRATE AND ACETAMINOPHEN 5; 325 MG/1; MG/1
1 TABLET ORAL EVERY 4 HOURS PRN
Qty: 12 TABLET | Refills: 0 | Status: SHIPPED | OUTPATIENT
Start: 2024-09-16 | End: 2024-09-19

## 2024-09-16 RX ADMIN — METHOCARBAMOL 500 MG: 500 TABLET ORAL at 11:55

## 2024-09-16 RX ADMIN — HYDROCODONE BITARTRATE AND ACETAMINOPHEN 1 TABLET: 5; 325 TABLET ORAL at 11:55

## 2024-09-16 ASSESSMENT — LIFESTYLE VARIABLES
HOW OFTEN DO YOU HAVE A DRINK CONTAINING ALCOHOL: MONTHLY OR LESS
HOW MANY STANDARD DRINKS CONTAINING ALCOHOL DO YOU HAVE ON A TYPICAL DAY: 1 OR 2

## 2024-09-16 ASSESSMENT — PAIN DESCRIPTION - LOCATION
LOCATION: RIB CAGE
LOCATION: HEAD

## 2024-09-16 ASSESSMENT — PAIN DESCRIPTION - ORIENTATION
ORIENTATION: RIGHT;POSTERIOR
ORIENTATION: RIGHT

## 2024-09-16 ASSESSMENT — PAIN - FUNCTIONAL ASSESSMENT: PAIN_FUNCTIONAL_ASSESSMENT: 0-10

## 2024-09-16 ASSESSMENT — PAIN SCALES - GENERAL
PAINLEVEL_OUTOF10: 7
PAINLEVEL_OUTOF10: 8

## 2024-09-16 ASSESSMENT — PAIN DESCRIPTION - DESCRIPTORS
DESCRIPTORS: ACHING
DESCRIPTORS: ACHING

## 2024-10-07 DIAGNOSIS — E55.9 VITAMIN D DEFICIENCY: ICD-10-CM

## 2024-10-07 DIAGNOSIS — R06.09 OTHER FORMS OF DYSPNEA: ICD-10-CM

## 2024-10-07 DIAGNOSIS — E78.2 MIXED HYPERLIPIDEMIA: ICD-10-CM

## 2024-10-07 DIAGNOSIS — E11.9 TYPE 2 DIABETES MELLITUS WITHOUT COMPLICATION, WITHOUT LONG-TERM CURRENT USE OF INSULIN (HCC): ICD-10-CM

## 2024-10-07 DIAGNOSIS — E87.6 DIURETIC-INDUCED HYPOKALEMIA: ICD-10-CM

## 2024-10-07 DIAGNOSIS — J45.40 MODERATE PERSISTENT ASTHMA, UNCOMPLICATED: ICD-10-CM

## 2024-10-07 DIAGNOSIS — I10 PRIMARY HYPERTENSION: ICD-10-CM

## 2024-10-07 DIAGNOSIS — T50.2X5A DIURETIC-INDUCED HYPOKALEMIA: ICD-10-CM

## 2024-10-07 RX ORDER — POTASSIUM CHLORIDE 750 MG/1
30 TABLET, EXTENDED RELEASE ORAL DAILY
Qty: 90 TABLET | Refills: 11 | Status: SHIPPED | OUTPATIENT
Start: 2024-10-07

## 2024-10-07 RX ORDER — ALBUTEROL SULFATE 90 UG/1
INHALANT RESPIRATORY (INHALATION)
Qty: 8.5 EACH | Refills: 1 | Status: SHIPPED | OUTPATIENT
Start: 2024-10-07

## 2024-10-07 RX ORDER — ERGOCALCIFEROL 1.25 MG/1
50000 CAPSULE, LIQUID FILLED ORAL WEEKLY
Qty: 4 CAPSULE | Refills: 11 | Status: SHIPPED | OUTPATIENT
Start: 2024-10-07

## 2024-10-07 RX ORDER — ATORVASTATIN CALCIUM 20 MG/1
20 TABLET, FILM COATED ORAL DAILY
Qty: 30 TABLET | Refills: 11 | Status: SHIPPED | OUTPATIENT
Start: 2024-10-07

## 2024-10-07 RX ORDER — OLMESARTAN MEDOXOMIL 40 MG/1
40 TABLET ORAL DAILY
Qty: 90 TABLET | Refills: 3 | Status: SHIPPED | OUTPATIENT
Start: 2024-10-07

## 2024-10-07 RX ORDER — AMLODIPINE BESYLATE 5 MG/1
5 TABLET ORAL DAILY
Qty: 90 TABLET | Refills: 3 | OUTPATIENT
Start: 2024-10-07

## 2024-10-07 RX ORDER — LABETALOL 300 MG/1
TABLET, FILM COATED ORAL
Qty: 270 TABLET | Refills: 3 | Status: SHIPPED | OUTPATIENT
Start: 2024-10-07

## 2024-10-24 DIAGNOSIS — I10 PRIMARY HYPERTENSION: ICD-10-CM

## 2024-10-29 ENCOUNTER — CLINICAL DOCUMENTATION (OUTPATIENT)
Age: 59
End: 2024-10-29

## 2024-10-29 ENCOUNTER — OFFICE VISIT (OUTPATIENT)
Facility: CLINIC | Age: 59
End: 2024-10-29
Payer: COMMERCIAL

## 2024-10-29 VITALS
RESPIRATION RATE: 16 BRPM | HEIGHT: 74 IN | OXYGEN SATURATION: 94 % | HEART RATE: 82 BPM | BODY MASS INDEX: 40.4 KG/M2 | TEMPERATURE: 97.3 F | SYSTOLIC BLOOD PRESSURE: 128 MMHG | DIASTOLIC BLOOD PRESSURE: 96 MMHG | WEIGHT: 314.8 LBS

## 2024-10-29 DIAGNOSIS — I10 PRIMARY HYPERTENSION: Primary | ICD-10-CM

## 2024-10-29 DIAGNOSIS — M19.011 PRIMARY OSTEOARTHRITIS OF RIGHT SHOULDER: ICD-10-CM

## 2024-10-29 DIAGNOSIS — M47.816 LUMBAR SPONDYLOSIS: ICD-10-CM

## 2024-10-29 DIAGNOSIS — E78.2 MIXED HYPERLIPIDEMIA: ICD-10-CM

## 2024-10-29 DIAGNOSIS — Z91.81 HISTORY OF RECENT FALL: ICD-10-CM

## 2024-10-29 DIAGNOSIS — S20.211D CONTUSION OF RIB ON RIGHT SIDE, SUBSEQUENT ENCOUNTER: ICD-10-CM

## 2024-10-29 PROCEDURE — 3080F DIAST BP >= 90 MM HG: CPT | Performed by: NURSE PRACTITIONER

## 2024-10-29 PROCEDURE — 3074F SYST BP LT 130 MM HG: CPT | Performed by: NURSE PRACTITIONER

## 2024-10-29 PROCEDURE — 99214 OFFICE O/P EST MOD 30 MIN: CPT | Performed by: NURSE PRACTITIONER

## 2024-10-29 RX ORDER — ATORVASTATIN CALCIUM 80 MG/1
80 TABLET, FILM COATED ORAL NIGHTLY
Qty: 90 TABLET | Refills: 3 | Status: SHIPPED | OUTPATIENT
Start: 2024-10-29

## 2024-10-29 ASSESSMENT — PATIENT HEALTH QUESTIONNAIRE - PHQ9
SUM OF ALL RESPONSES TO PHQ QUESTIONS 1-9: 0
SUM OF ALL RESPONSES TO PHQ9 QUESTIONS 1 & 2: 0
SUM OF ALL RESPONSES TO PHQ QUESTIONS 1-9: 0
SUM OF ALL RESPONSES TO PHQ QUESTIONS 1-9: 0
1. LITTLE INTEREST OR PLEASURE IN DOING THINGS: NOT AT ALL
2. FEELING DOWN, DEPRESSED OR HOPELESS: NOT AT ALL
SUM OF ALL RESPONSES TO PHQ QUESTIONS 1-9: 0

## 2024-10-29 NOTE — PROGRESS NOTES
Miquel Castillo is a 59 y.o. male    Chief Complaint   Patient presents with    Hypertension    Follow-up       BP (!) 146/104   Pulse 85   Temp 97.3 °F (36.3 °C)   Resp 16   Ht 1.88 m (6' 2\")   Wt (!) 142.8 kg (314 lb 12.8 oz)   SpO2 94%   BMI 40.42 kg/m²         1. Have you been to the ER, urgent care clinic since your last visit?  Hospitalized since your last visit? Yes    2. Have you seen or consulted any other health care providers outside of the [facility] since your last visit?  Include any pap smears or colon screening. No    Learning Assessment:       No data to display                Fall Risk Assessment:       No data to display                Abuse Screening:       No data to display                ADL Screening:       No data to display                
10/29/2024     9:51 AM 9/16/2024    10:49 AM 9/4/2024    10:53 AM 7/23/2024     8:45 AM 7/3/2024    12:47 PM 5/8/2024     1:38 PM 4/23/2024     2:18 PM   Weight Metrics   Weight 314 lb 12.8 oz 326 lb 329 lb 345 lb 342 lb 350 lb 350 lb   BMI (Calculated) 40.5 kg/m2 41.9 kg/m2 42.3 kg/m2 44.4 kg/m2 44 kg/m2 45 kg/m2 45 kg/m2     Has seen cardio, but not sleep med since last visit. They were supposed to call him.     Review of Systems  Constitutional: negative for fevers, chills, anorexia and weight loss  Respiratory:  negative for cough, hemoptysis, dyspnea, and wheezing  CV:   negative for chest pain, palpitations, and lower extremity edema  GI:   negative for nausea, vomiting, diarrhea, abdominal pain, and melena  Endo:               negative for polyuria,polydipsia,polyphagia, and heat intolerance  Genitourinary: negative for frequency, urgency, dysuria, retention, and hematuria  Integument:  negative for rash, ulcerations, and pruritus  Hematologic:  negative for easy bruising and bleeding  Musculoskel: negative for arthralgias, muscle weakness,and joint pain/swelling  Neurological:  negative for headaches, dizziness, vertigo,and memory/gait problems  Behavl/Psych: negative for feelings of anxiety, depression, mood changes, and suicidal ideation    Past Medical History:   Diagnosis Date    Diabetes mellitus (HCC)     Fracture 09/29/2024    Fracture of sternum/upper rib and lower rib    HTN (hypertension)     Hypokalemia        Past Surgical History:   Procedure Laterality Date    PRE-MALIGNANT / BENIGN SKIN LESION EXCISION      cyst removal on scalp       Current Outpatient Medications   Medication Sig    atorvastatin (LIPITOR) 80 MG tablet Take 1 tablet by mouth at bedtime    albuterol sulfate HFA (PROVENTIL;VENTOLIN;PROAIR) 108 (90 Base) MCG/ACT inhaler INHALE 2 PUFFS BY MOUTH EVERY 4 HRS AS NEEDED FOR WHEEZE OR SHORTNESS OF BREATH, PERSISTENT COUGHING    labetalol (NORMODYNE) 300 MG tablet TAKE 1 TABLET BY

## 2024-10-30 RX ORDER — AMLODIPINE BESYLATE 5 MG/1
5 TABLET ORAL DAILY
Qty: 90 TABLET | Refills: 3 | OUTPATIENT
Start: 2024-10-30

## 2024-11-13 DIAGNOSIS — J44.1 CHRONIC OBSTRUCTIVE PULMONARY DISEASE WITH ACUTE EXACERBATION (HCC): ICD-10-CM

## 2024-11-13 DIAGNOSIS — R06.09 OTHER FORMS OF DYSPNEA: ICD-10-CM

## 2024-11-13 DIAGNOSIS — J45.40 MODERATE PERSISTENT ASTHMA, UNCOMPLICATED: ICD-10-CM

## 2024-11-14 DIAGNOSIS — J44.1 CHRONIC OBSTRUCTIVE PULMONARY DISEASE WITH ACUTE EXACERBATION (HCC): ICD-10-CM

## 2024-11-14 DIAGNOSIS — J45.40 MODERATE PERSISTENT ASTHMA, UNCOMPLICATED: ICD-10-CM

## 2024-11-14 DIAGNOSIS — R06.09 OTHER FORMS OF DYSPNEA: ICD-10-CM

## 2024-11-14 RX ORDER — ALBUTEROL SULFATE 90 UG/1
INHALANT RESPIRATORY (INHALATION)
Qty: 8.5 EACH | Refills: 1 | Status: SHIPPED | OUTPATIENT
Start: 2024-11-14

## 2024-11-14 RX ORDER — ALBUTEROL SULFATE 90 UG/1
INHALANT RESPIRATORY (INHALATION)
Qty: 8.5 EACH | Refills: 1 | OUTPATIENT
Start: 2024-11-14

## 2024-11-14 RX ORDER — TIOTROPIUM BROMIDE AND OLODATEROL 3.124; 2.736 UG/1; UG/1
SPRAY, METERED RESPIRATORY (INHALATION)
Refills: 5 | OUTPATIENT
Start: 2024-11-14

## 2024-11-28 DIAGNOSIS — I10 PRIMARY HYPERTENSION: ICD-10-CM

## 2024-11-29 RX ORDER — TIOTROPIUM BROMIDE 18 UG/1
CAPSULE ORAL; RESPIRATORY (INHALATION)
Qty: 30 CAPSULE | Refills: 4 | OUTPATIENT
Start: 2024-11-29

## 2024-11-29 RX ORDER — AMLODIPINE BESYLATE 5 MG/1
5 TABLET ORAL DAILY
Qty: 90 TABLET | Refills: 3 | OUTPATIENT
Start: 2024-11-29

## 2024-12-04 ENCOUNTER — OFFICE VISIT (OUTPATIENT)
Age: 59
End: 2024-12-04
Payer: COMMERCIAL

## 2024-12-04 VITALS
HEART RATE: 79 BPM | BODY MASS INDEX: 40.04 KG/M2 | OXYGEN SATURATION: 95 % | SYSTOLIC BLOOD PRESSURE: 150 MMHG | DIASTOLIC BLOOD PRESSURE: 100 MMHG | WEIGHT: 312 LBS | HEIGHT: 74 IN

## 2024-12-04 DIAGNOSIS — E66.01 MORBID OBESITY: ICD-10-CM

## 2024-12-04 DIAGNOSIS — I10 PRIMARY HYPERTENSION: Primary | ICD-10-CM

## 2024-12-04 DIAGNOSIS — G47.33 OSA ON CPAP: ICD-10-CM

## 2024-12-04 DIAGNOSIS — R60.0 BILATERAL LEG EDEMA: ICD-10-CM

## 2024-12-04 DIAGNOSIS — R06.02 SHORTNESS OF BREATH: ICD-10-CM

## 2024-12-04 DIAGNOSIS — E78.2 MIXED HYPERLIPIDEMIA: ICD-10-CM

## 2024-12-04 PROCEDURE — 3077F SYST BP >= 140 MM HG: CPT | Performed by: SPECIALIST

## 2024-12-04 PROCEDURE — 99214 OFFICE O/P EST MOD 30 MIN: CPT | Performed by: SPECIALIST

## 2024-12-04 PROCEDURE — 3080F DIAST BP >= 90 MM HG: CPT | Performed by: SPECIALIST

## 2024-12-04 RX ORDER — CHLORTHALIDONE 25 MG/1
25 TABLET ORAL DAILY
COMMUNITY
Start: 2024-10-16

## 2024-12-04 ASSESSMENT — PATIENT HEALTH QUESTIONNAIRE - PHQ9
SUM OF ALL RESPONSES TO PHQ QUESTIONS 1-9: 0
2. FEELING DOWN, DEPRESSED OR HOPELESS: NOT AT ALL
SUM OF ALL RESPONSES TO PHQ QUESTIONS 1-9: 0
1. LITTLE INTEREST OR PLEASURE IN DOING THINGS: NOT AT ALL
SUM OF ALL RESPONSES TO PHQ QUESTIONS 1-9: 0
SUM OF ALL RESPONSES TO PHQ9 QUESTIONS 1 & 2: 0
SUM OF ALL RESPONSES TO PHQ QUESTIONS 1-9: 0

## 2024-12-04 NOTE — PROGRESS NOTES
1. Have you been to the ER, urgent care clinic since your last visit?  Hospitalized since your last visit?No    2. Have you seen or consulted any other health care providers outside of the [facility] since your last visit?  Include any pap smears or colon screening. No

## 2024-12-04 NOTE — PROGRESS NOTES
HISTORY OF PRESENT ILLNESS  Miquel Castillo is a 59 y.o. male     SUMMARY:   Patient Active Problem List   Diagnosis    HTN (hypertension)    Right sided sciatica    Dyspnea on exertion    DIMITRI on CPAP    Moderate persistent reactive airway disease without complication    Metabolic syndrome    Mixed hyperlipidemia    Diuretic-induced hypokalemia    Elevated TSH    Renal impairment    Type 2 diabetes mellitus without complication, without long-term current use of insulin (HCC)    BMI 40.0-44.9, adult    Primary osteoarthritis of right shoulder    Lumbar spondylosis            CARDIOLOGY STUDIES TO DATE:  5/24 mod lvh, otherwise normal echo    Chief Complaint   Patient presents with    3 Month Follow-Up    Hypertension       HPI :  He is doing great with no cardiac complaints or problems with his medications.  He continues to slowly and deliberately lose weight and is lost another 15 pounds since we last met.  He is walking at least 3 days a week without any worrisome symptoms.  His blood pressure is elevated today and turns out he has not been wearing his CPAP for a while because of machine malfunction and he is scheduled for another sleep study on the 17th.  Most recent lipid profile looked outstanding, his creatinine was up a bit and he is due to follow-up with his PCP for more blood work in the near future.    CARDIAC ROS:   negative for chest pain, claudication, dyspnea, irregular heart beat, lower extremity edema, orthopnea, paroxysmal nocturnal dyspnea, and syncope    Family History   Problem Relation Age of Onset    Hypertension Mother     Heart Disease Father 65        cabg    Hypertension Father        Past Medical History:   Diagnosis Date    Diabetes mellitus (HCC)     Fracture 09/29/2024    Fracture of sternum/upper rib and lower rib    HTN (hypertension)     Hypokalemia        Specialty Problems          Cardiology Problems    HTN (hypertension)        Mixed hyperlipidemia            GENERAL

## 2024-12-15 ENCOUNTER — TELEPHONE (OUTPATIENT)
Age: 59
End: 2024-12-15

## 2024-12-15 DIAGNOSIS — G47.33 OSA (OBSTRUCTIVE SLEEP APNEA): Primary | ICD-10-CM

## 2025-01-23 ENCOUNTER — OFFICE VISIT (OUTPATIENT)
Facility: CLINIC | Age: 60
End: 2025-01-23

## 2025-01-23 VITALS
HEART RATE: 77 BPM | HEIGHT: 74 IN | BODY MASS INDEX: 39.4 KG/M2 | WEIGHT: 307 LBS | SYSTOLIC BLOOD PRESSURE: 142 MMHG | TEMPERATURE: 98 F | RESPIRATION RATE: 18 BRPM | DIASTOLIC BLOOD PRESSURE: 90 MMHG | OXYGEN SATURATION: 97 %

## 2025-01-23 DIAGNOSIS — E11.22 TYPE 2 DIABETES MELLITUS WITH STAGE 3A CHRONIC KIDNEY DISEASE, WITHOUT LONG-TERM CURRENT USE OF INSULIN (HCC): Primary | ICD-10-CM

## 2025-01-23 DIAGNOSIS — E66.812 OBESITY, CLASS II, BMI 35-39.9: ICD-10-CM

## 2025-01-23 DIAGNOSIS — I10 PRIMARY HYPERTENSION: ICD-10-CM

## 2025-01-23 DIAGNOSIS — N18.31 TYPE 2 DIABETES MELLITUS WITH STAGE 3A CHRONIC KIDNEY DISEASE, WITHOUT LONG-TERM CURRENT USE OF INSULIN (HCC): Primary | ICD-10-CM

## 2025-01-23 DIAGNOSIS — G47.33 OSA ON CPAP: ICD-10-CM

## 2025-01-23 DIAGNOSIS — E78.2 MIXED HYPERLIPIDEMIA: ICD-10-CM

## 2025-01-23 RX ORDER — PEN NEEDLE, DIABETIC 31 GX5/16"
NEEDLE, DISPOSABLE MISCELLANEOUS
Qty: 200 EACH | Refills: 3 | Status: SHIPPED | OUTPATIENT
Start: 2025-01-23

## 2025-01-23 RX ORDER — FINERENONE 20 MG/1
20 TABLET, FILM COATED ORAL DAILY
Qty: 90 TABLET | Refills: 3 | Status: SHIPPED | OUTPATIENT
Start: 2025-01-23

## 2025-01-23 RX ORDER — BLOOD-GLUCOSE METER
KIT MISCELLANEOUS
Qty: 1 KIT | Refills: 0 | Status: SHIPPED | OUTPATIENT
Start: 2025-01-23

## 2025-01-23 RX ORDER — LANCING DEVICE
EACH MISCELLANEOUS
Qty: 1 EACH | Refills: 2 | Status: SHIPPED | OUTPATIENT
Start: 2025-01-23

## 2025-01-23 RX ORDER — LANCETS 30 GAUGE
1 EACH MISCELLANEOUS DAILY
Qty: 200 EACH | Refills: 3 | Status: SHIPPED | OUTPATIENT
Start: 2025-01-23

## 2025-01-23 RX ORDER — GLUCOSAMINE HCL/CHONDROITIN SU 500-400 MG
CAPSULE ORAL
Qty: 200 STRIP | Refills: 3 | Status: SHIPPED | OUTPATIENT
Start: 2025-01-23

## 2025-01-23 SDOH — ECONOMIC STABILITY: FOOD INSECURITY: WITHIN THE PAST 12 MONTHS, YOU WORRIED THAT YOUR FOOD WOULD RUN OUT BEFORE YOU GOT MONEY TO BUY MORE.: NEVER TRUE

## 2025-01-23 SDOH — ECONOMIC STABILITY: FOOD INSECURITY: WITHIN THE PAST 12 MONTHS, THE FOOD YOU BOUGHT JUST DIDN'T LAST AND YOU DIDN'T HAVE MONEY TO GET MORE.: NEVER TRUE

## 2025-01-23 ASSESSMENT — PATIENT HEALTH QUESTIONNAIRE - PHQ9
SUM OF ALL RESPONSES TO PHQ QUESTIONS 1-9: 0
SUM OF ALL RESPONSES TO PHQ QUESTIONS 1-9: 0
2. FEELING DOWN, DEPRESSED OR HOPELESS: NOT AT ALL
SUM OF ALL RESPONSES TO PHQ9 QUESTIONS 1 & 2: 0
SUM OF ALL RESPONSES TO PHQ QUESTIONS 1-9: 0
SUM OF ALL RESPONSES TO PHQ QUESTIONS 1-9: 0
1. LITTLE INTEREST OR PLEASURE IN DOING THINGS: NOT AT ALL

## 2025-01-23 NOTE — PROGRESS NOTES
Miquel RAMIREZ Page 1965 is a 59 y.o. male, Established patient, here for evaluation of the following chief complaint(s):  Follow-up (12 weeks HTN, DM, CHOL, BMI)      ASSESSMENT/PLAN:  Below is the assessment and plan developed based on review of pertinent history, physical exam, labs, studies, and medications.       Diagnosis Orders   1. Type 2 diabetes mellitus with stage 3a chronic kidney disease, without long-term current use of insulin (HCC)  Hemoglobin A1C    Basic Metabolic Panel    Albumin/Creatinine Ratio, Urine    Finerenone (KERENDIA) 20 MG TABS    Alcohol Swabs (ALCOHOL PREP) PADS    blood glucose monitor strips    Lancets MISC    glucose monitoring kit    Lancet Devices (LANCING DEVICE) MISC      2. DIMITRI on CPAP        3. Obesity, Class II, BMI 35-39.9        4. Primary hypertension        5. Mixed hyperlipidemia            HCC Dx review: DM-stable, new order for DM test supplies sent. Repeat labs otherwise.     Specific pt instructions until next visit: call if any problems, HTN closer to goal, will await CPAP restart before considering addition of clonidine patch or other med change. Congratulated pt on 5# wt loss, challenged to lose 7 more lbs by fu.     Current medication regimen is effective. See adjustments or prescriptions as noted above. Continue meds as prescribed.          Follow-up and Dispositions    Return in about 6 months (around 7/23/2025) for OV- HTN, DM, CHOL, DIMITRI, BMI.           SUBJECTIVE/OBJECTIVE:  HPI    Pt presents to f/u HTN with CKD, DM, BMI, DIMITRI, & CHOL. No home BP readings or blood sugar level, needs a cuff and new meter. Taking meds as prescribed. Denies side effects from medication. Feels good, more active, walking 30 min x 3 days weekly. Will have sleep study on 2/18 for new APAP settings.  No report of unilateral weakness, headaches, blurring vision, CP, SOB,or  leg swelling. No report of polydipsia, polyphagia, or polyuria.   BP Readings from Last 3 Encounters:

## 2025-01-23 NOTE — PROGRESS NOTES
Pt is here for   Chief Complaint   Patient presents with    Follow-up     12 weeks HTN, DM, CHOL, BMI     \"Have you been to the ER, urgent care clinic since your last visit?  Hospitalized since your last visit?\"    NO    “Have you seen or consulted any other health care providers outside our system since your last visit?”    NO      “Have you had a colorectal cancer screening such as a colonoscopy/FIT/Cologuard?    NO    No colonoscopy on file  No cologuard on file  No FIT/FOBT on file   No flexible sigmoidoscopy on file     “Have you had a diabetic eye exam?”    NO     No diabetic eye exam on file

## 2025-02-18 ENCOUNTER — HOSPITAL ENCOUNTER (OUTPATIENT)
Facility: HOSPITAL | Age: 60
Discharge: HOME | End: 2025-02-21
Payer: COMMERCIAL

## 2025-02-18 DIAGNOSIS — G47.33 OBSTRUCTIVE SLEEP APNEA (ADULT) (PEDIATRIC): ICD-10-CM

## 2025-02-18 DIAGNOSIS — G47.33 OBSTRUCTIVE SLEEP APNEA (ADULT) (PEDIATRIC): Primary | ICD-10-CM

## 2025-02-18 PROCEDURE — 95811 POLYSOM 6/>YRS CPAP 4/> PARM: CPT | Performed by: INTERNAL MEDICINE

## 2025-02-19 VITALS
SYSTOLIC BLOOD PRESSURE: 156 MMHG | HEART RATE: 84 BPM | DIASTOLIC BLOOD PRESSURE: 96 MMHG | BODY MASS INDEX: 39.14 KG/M2 | HEIGHT: 74 IN | OXYGEN SATURATION: 95 % | WEIGHT: 305 LBS | TEMPERATURE: 97.6 F

## 2025-02-21 ENCOUNTER — CLINICAL DOCUMENTATION (OUTPATIENT)
Age: 60
End: 2025-02-21

## 2025-02-21 DIAGNOSIS — G47.33 OBSTRUCTIVE SLEEP APNEA (ADULT) (PEDIATRIC): Primary | ICD-10-CM

## 2025-02-21 NOTE — PROGRESS NOTES
Results of sleep study in [facility] to convey results to patient    PAP titration was performed to optimize airflow settings to control his apnea/respiratory events. It was found that a setting of BIPAP 20/16 cmH20 adequately controlled his respiratory events. Oxygen saturation was maintained at or above 88% at this setting.   CPAP did not control respiratory events so therapy changed to bi-level (which has a higer inhale pressure and then lowers on exhalation for comfort)     I will order a BiPAP device for his home use. It will start a little lower than the final pressure setting in the lab ,for his comfort,and will adjust up during the night. he should be seen in the sleep center after he has been on PAP for 4-6 weeks. We will assess how he is doing on PAP therapy and make any further adjustments (if needed).     Patient should call the office the day he gets set up with new PAP device so we can schedule him for an adherence/compliance visit within 31-90 days of obtaining a new device.     PAP order attached.  Staff to kindly order PAP and call patient and let them know which [employer] company they should be hearing from.    (patient will need a first adherence visit after 4-6 weeks on therapy)

## 2025-02-24 ENCOUNTER — TELEPHONE (OUTPATIENT)
Age: 60
End: 2025-02-24

## 2025-02-24 NOTE — TELEPHONE ENCOUNTER
Reviewed sleep study results with patient. He expressed understanding and is willing to proceed with BIPAP.

## 2025-02-25 ENCOUNTER — CLINICAL DOCUMENTATION (OUTPATIENT)
Age: 60
End: 2025-02-25

## 2025-04-10 ENCOUNTER — TELEPHONE (OUTPATIENT)
Facility: CLINIC | Age: 60
End: 2025-04-10

## 2025-04-10 NOTE — TELEPHONE ENCOUNTER
Patient needs an authorization sent to his insurance company ([employer]) for his use of drug Kerendia.  Patient can be reached at [phone] for any questions.

## 2025-04-28 DIAGNOSIS — I1A.0 RESISTANT HYPERTENSION: ICD-10-CM

## 2025-04-28 RX ORDER — AMLODIPINE BESYLATE 10 MG/1
10 TABLET ORAL DAILY
Qty: 30 TABLET | Refills: 11 | Status: SHIPPED | OUTPATIENT
Start: 2025-04-28

## 2025-04-30 ENCOUNTER — CLINICAL DOCUMENTATION (OUTPATIENT)
Facility: CLINIC | Age: 60
End: 2025-04-30

## 2025-04-30 NOTE — PROGRESS NOTES
Approved  PA Detail   Prior authorization approved  Payer: [facility] - Managed Medicaid Case ID: [id number]  Note from payer: Your PA request has been approved. Additional information will be provided in the approval communication. (Message 1145)  Approval Details    Authorized from April 10, 2025 to April 10, 2026    Finerenone (KERENDIA) 20 MG TABS  Take 20 mg by mouth daily

## 2025-05-01 DIAGNOSIS — I10 ESSENTIAL (PRIMARY) HYPERTENSION: ICD-10-CM

## 2025-05-01 DIAGNOSIS — E78.2 MIXED HYPERLIPIDEMIA: ICD-10-CM

## 2025-05-01 DIAGNOSIS — I10 PRIMARY HYPERTENSION: ICD-10-CM

## 2025-05-01 DIAGNOSIS — R79.89 ELEVATED BRAIN NATRIURETIC PEPTIDE (BNP) LEVEL: ICD-10-CM

## 2025-05-01 DIAGNOSIS — E87.6 HYPOKALEMIA: ICD-10-CM

## 2025-05-02 RX ORDER — TIOTROPIUM BROMIDE 18 UG/1
CAPSULE ORAL; RESPIRATORY (INHALATION)
Qty: 30 CAPSULE | Refills: 4 | Status: SHIPPED | OUTPATIENT
Start: 2025-05-02

## 2025-05-02 RX ORDER — EZETIMIBE 10 MG/1
10 TABLET ORAL DAILY
Qty: 30 TABLET | Refills: 11 | Status: SHIPPED | OUTPATIENT
Start: 2025-05-02

## 2025-05-02 RX ORDER — AMLODIPINE BESYLATE 5 MG/1
5 TABLET ORAL DAILY
Qty: 90 TABLET | Refills: 3 | OUTPATIENT
Start: 2025-05-02

## 2025-05-02 RX ORDER — SPIRONOLACTONE 25 MG/1
25 TABLET ORAL DAILY
Qty: 30 TABLET | Refills: 11 | Status: SHIPPED | OUTPATIENT
Start: 2025-05-02

## 2025-05-15 ENCOUNTER — TELEPHONE (OUTPATIENT)
Age: 60
End: 2025-05-15

## 2025-05-15 NOTE — TELEPHONE ENCOUNTER
Spoke with Ori from [employer] and Mr. Castillo has not been setup with a new PAP device. He is not eligible for a new Bilevel device at this time.

## 2025-05-20 ASSESSMENT — SLEEP AND FATIGUE QUESTIONNAIRES
ESS TOTAL SCORE: 6
HOW LIKELY ARE YOU TO NOD OFF OR FALL ASLEEP WHILE WATCHING TV: SLIGHT CHANCE OF DOZING
HOW LIKELY ARE YOU TO NOD OFF OR FALL ASLEEP IN A CAR, WHILE STOPPED FOR A FEW MINUTES IN TRAFFIC: WOULD NEVER DOZE
HOW LIKELY ARE YOU TO NOD OFF OR FALL ASLEEP WHEN YOU ARE A PASSENGER IN A CAR FOR AN HOUR WITHOUT A BREAK: MODERATE CHANCE OF DOZING
HOW LIKELY ARE YOU TO NOD OFF OR FALL ASLEEP WHILE SITTING QUIETLY AFTER LUNCH WITHOUT ALCOHOL: SLIGHT CHANCE OF DOZING
HOW LIKELY ARE YOU TO NOD OFF OR FALL ASLEEP WHILE LYING DOWN TO REST IN THE AFTERNOON WHEN CIRCUMSTANCES PERMIT: SLIGHT CHANCE OF DOZING
HOW LIKELY ARE YOU TO NOD OFF OR FALL ASLEEP WHILE SITTING INACTIVE IN A PUBLIC PLACE: WOULD NEVER DOZE
HOW LIKELY ARE YOU TO NOD OFF OR FALL ASLEEP WHILE SITTING AND TALKING TO SOMEONE: WOULD NEVER DOZE
HOW LIKELY ARE YOU TO NOD OFF OR FALL ASLEEP WHILE SITTING AND READING: SLIGHT CHANCE OF DOZING

## 2025-05-22 ENCOUNTER — TELEMEDICINE (OUTPATIENT)
Age: 60
End: 2025-05-22
Payer: COMMERCIAL

## 2025-05-22 DIAGNOSIS — I10 ESSENTIAL (PRIMARY) HYPERTENSION: ICD-10-CM

## 2025-05-22 DIAGNOSIS — G47.33 OBSTRUCTIVE SLEEP APNEA (ADULT) (PEDIATRIC): Primary | ICD-10-CM

## 2025-05-22 PROCEDURE — 99213 OFFICE O/P EST LOW 20 MIN: CPT | Performed by: NURSE PRACTITIONER

## 2025-05-22 NOTE — PATIENT INSTRUCTIONS
[address]  [place] [address]  Tel.  [phone]  Fax. [phone] [address]  [place] [address]  Tel.  [phone]  Fax. [phone] [address] [place] [address]  Tel.  [phone]  Fax. [phone]     Learning About CPAP for Sleep Apnea  What is CPAP?              CPAP is a small machine that you use at home every night while you sleep. It increases air pressure in your throat to keep your airway open. When you have sleep apnea, this can help you sleep better so you feel much better. CPAP stands for \"continuous positive airway pressure.\"  The CPAP machine will have one of the following:  A mask that covers your nose and mouth  Prongs that fit into your nose  A mask that covers your nose only, the most common type. This type is called NCPAP. The N stands for \"nasal.\"  Why is it done?  CPAP is usually the best treatment for obstructive sleep apnea. It is the first treatment choice and the most widely used. Your doctor may suggest CPAP if you have:  Moderate to severe sleep apnea.  Sleep apnea and coronary artery disease (CAD) or heart failure.  How does it help?  CPAP can help you have more normal sleep, so you feel less sleepy and more alert during the daytime.  CPAP may help keep heart failure or other heart problems from getting worse.  NCPAP may help lower your blood pressure.  If you use CPAP, your bed partner may also sleep better because you are not snoring or restless.  What are the side effects?  Some people who use CPAP have:  A dry or stuffy nose and a sore throat.  Irritated skin on the face.  Sore eyes.  Bloating.  If you have any of these problems, work with your doctor to fix them. Here are some things you can try:  Be sure the mask or nasal prongs fit well.  See if your doctor can adjust the pressure of your CPAP.  If your nose is dry, try a humidifier.  If your nose is runny or stuffy, try decongestant medicine or a steroid

## 2025-05-22 NOTE — PROGRESS NOTES
medications if warranted.  I have reviewed the relationship between hypertension as it relates to sleep-disordered breathing.    3. Recommended a dedicated weight loss program through appropriate diet and exercise regimen as significant weight reduction has been shown to reduce severity of obstructive sleep apnea.     SUBJECTIVE/OBJECTIVE:    He  is seen today for follow up on PAP device and reports no problems using the device.   The following concerns reviewed:    Drowsiness no Problems exhaling no   Snoring no Forget to put on no   Mask Comfortable yes Can't fall asleep no   Dry Mouth no Mask falls off no   Air Leaking no Frequent awakenings no     He admits that his sleep has improved on PAP therapy using full face mask and heated tubing.     Review of device download indicated:  No data on your machine         5/20/2025     1:19 PM 2/18/2025     8:44 PM 4/29/2024    10:00 AM 10/27/2022    10:25 AM   Sleep Medicine   Sitting and reading 1  3 3   Watching TV 1  3 2   Sitting, inactive in a public place (e.g. a theatre or a meeting) 0  2 1   As a passenger in a car for an hour without a break 2  2 2   Lying down to rest in the afternoon when circumstances permit 1  2 3   Sitting and talking to someone 0  1 1   Sitting quietly after a lunch without alcohol 1  2 2   In a car, while stopped for a few minutes in traffic 0  3 0   [place] Sleepiness Score 6  18 14   Neck (Inches)  17      which reflects improved sleep quality over therapy time.    Sleep Review of Systems: notable for Negative difficulty falling asleep; Negative  awakenings at night; Negative early morning headaches; Negative memory problems; Negative concentration issues; Negative chest pain; Negative shortness of breath; Negative significant joint pain at night; Negative significant muscle pain at night; Negative rashes or itching; Negative heartburn; Negative significant mood issues;     Vitals reported by patient        No data to display

## 2025-06-02 ENCOUNTER — OFFICE VISIT (OUTPATIENT)
Age: 60
End: 2025-06-02
Payer: COMMERCIAL

## 2025-06-02 VITALS
SYSTOLIC BLOOD PRESSURE: 138 MMHG | BODY MASS INDEX: 39.06 KG/M2 | RESPIRATION RATE: 22 BRPM | HEART RATE: 77 BPM | HEIGHT: 74 IN | WEIGHT: 304.4 LBS | DIASTOLIC BLOOD PRESSURE: 88 MMHG | OXYGEN SATURATION: 96 %

## 2025-06-02 DIAGNOSIS — G47.33 OSA ON CPAP: ICD-10-CM

## 2025-06-02 DIAGNOSIS — R60.0 BILATERAL LEG EDEMA: ICD-10-CM

## 2025-06-02 DIAGNOSIS — I10 PRIMARY HYPERTENSION: Primary | ICD-10-CM

## 2025-06-02 DIAGNOSIS — E78.2 MIXED HYPERLIPIDEMIA: ICD-10-CM

## 2025-06-02 DIAGNOSIS — E66.01 MORBID OBESITY (HCC): ICD-10-CM

## 2025-06-02 PROCEDURE — 99214 OFFICE O/P EST MOD 30 MIN: CPT | Performed by: SPECIALIST

## 2025-06-02 PROCEDURE — 3075F SYST BP GE 130 - 139MM HG: CPT | Performed by: SPECIALIST

## 2025-06-02 PROCEDURE — 3079F DIAST BP 80-89 MM HG: CPT | Performed by: SPECIALIST

## 2025-06-02 ASSESSMENT — PATIENT HEALTH QUESTIONNAIRE - PHQ9
1. LITTLE INTEREST OR PLEASURE IN DOING THINGS: NOT AT ALL
SUM OF ALL RESPONSES TO PHQ QUESTIONS 1-9: 0
2. FEELING DOWN, DEPRESSED OR HOPELESS: NOT AT ALL
SUM OF ALL RESPONSES TO PHQ QUESTIONS 1-9: 0

## 2025-06-02 NOTE — PROGRESS NOTES
Chief Complaint   Patient presents with    Hypertension    Follow-up    Shortness of Breath    Edema     BP (!) 148/90 (BP Site: Left Upper Arm, Patient Position: Sitting, BP Cuff Size: Medium Adult)   Pulse 75   Resp 22   Ht 1.88 m (6' 2\")   Wt (!) 138.1 kg (304 lb 6.4 oz)   SpO2 96%   BMI 39.08 kg/m²       1. Have you been to the ER, urgent care clinic since your last visit?  Hospitalized since your last visit?No    2. Have you seen or consulted any other health care providers outside of the [facility] since your last visit?  Include any pap smears or colon screening. No    
unable to edit it. 200 strip 3    Lancets MISC 1 each by Does not apply route daily Use to check blood glucose levels twice daily. May substitute with INSURANCE APPROVED METER. Disregard in any other specific meter brands attached with this order. I am unable to edit it. 200 each 3    glucose monitoring kit Use to check blood sugar level daily. May substitute with INSURANCE APPROVED METER. Disregard in any other specific meter brands attached with this order. I am unable to edit it. 1 kit 0    Lancet Devices (LANCING DEVICE) MISC Dispense insurance approved and patient preferred device 1 each 2    chlorthalidone (HYGROTON) 25 MG tablet Take 1 tablet by mouth daily      albuterol sulfate HFA (PROVENTIL;VENTOLIN;PROAIR) 108 (90 Base) MCG/ACT inhaler INHALE 2 PUFFS BY MOUTH EVERY 4 HRS AS NEEDED FOR WHEEZE OR SHORTNESS OF BREATH, PERSISTENT COUGHING 8.5 each 1    tiotropium-olodaterol (STIOLTO RESPIMAT) 2.5-2.5 MCG/ACT AERS Inhale 2 puffs into the lungs daily 1 each 5    atorvastatin (LIPITOR) 80 MG tablet Take 1 tablet by mouth at bedtime 90 tablet 3    labetalol (NORMODYNE) 300 MG tablet TAKE 1 TABLET BY MOUTH IN THE MORNING AT NOON AND IN THE EVENING 270 tablet 3    olmesartan (BENICAR) 40 MG tablet TAKE 1 TABLET BY MOUTH EVERY DAY 90 tablet 3    KLOR-CON M10 10 MEQ extended release tablet TAKE 3 TABLETS BY MOUTH DAILY 90 tablet 11    vitamin D (ERGOCALCIFEROL) 1.25 MG (74986 UT) CAPS capsule TAKE 1 CAPSULE BY MOUTH ONE TIME PER WEEK 4 capsule 11    metFORMIN (GLUCOPHAGE) 500 MG tablet TAKE 1 TABLET BY MOUTH TWICE A DAY WITH MEALS 180 tablet 3    SPIRIVA HANDIHALER 18 MCG inhalation capsule INHALE 1 CAPSULE DAILY (Patient not taking: Reported on 6/2/2025) 30 capsule 4     No current facility-administered medications for this visit.         ALLERGIES  Patient has no known allergies.       ASSESSMENT & PLAN:      He is stable and asymptomatic at this point, well compensated on a good medical regimen and needs no

## 2025-07-24 ENCOUNTER — OFFICE VISIT (OUTPATIENT)
Facility: CLINIC | Age: 60
End: 2025-07-24
Payer: COMMERCIAL

## 2025-07-24 VITALS
DIASTOLIC BLOOD PRESSURE: 69 MMHG | TEMPERATURE: 98.3 F | WEIGHT: 296 LBS | BODY MASS INDEX: 37.99 KG/M2 | HEIGHT: 74 IN | RESPIRATION RATE: 17 BRPM | SYSTOLIC BLOOD PRESSURE: 101 MMHG | OXYGEN SATURATION: 100 % | HEART RATE: 76 BPM

## 2025-07-24 DIAGNOSIS — N18.31 TYPE 2 DIABETES MELLITUS WITH STAGE 3A CHRONIC KIDNEY DISEASE, WITHOUT LONG-TERM CURRENT USE OF INSULIN (HCC): ICD-10-CM

## 2025-07-24 DIAGNOSIS — E11.22 TYPE 2 DIABETES MELLITUS WITH STAGE 3A CHRONIC KIDNEY DISEASE, WITHOUT LONG-TERM CURRENT USE OF INSULIN (HCC): ICD-10-CM

## 2025-07-24 DIAGNOSIS — E11.9 TYPE 2 DIABETES MELLITUS WITHOUT COMPLICATION, WITHOUT LONG-TERM CURRENT USE OF INSULIN (HCC): ICD-10-CM

## 2025-07-24 DIAGNOSIS — G47.33 OSA ON CPAP: ICD-10-CM

## 2025-07-24 DIAGNOSIS — I10 PRIMARY HYPERTENSION: Primary | ICD-10-CM

## 2025-07-24 DIAGNOSIS — M47.816 LUMBAR SPONDYLOSIS: ICD-10-CM

## 2025-07-24 DIAGNOSIS — M54.31 RIGHT SIDED SCIATICA: ICD-10-CM

## 2025-07-24 LAB
ANION GAP SERPL CALC-SCNC: 6 MMOL/L (ref 2–12)
BUN SERPL-MCNC: 30 MG/DL (ref 6–20)
BUN/CREAT SERPL: 16 (ref 12–20)
CALCIUM SERPL-MCNC: 9.5 MG/DL (ref 8.5–10.1)
CHLORIDE SERPL-SCNC: 107 MMOL/L (ref 97–108)
CO2 SERPL-SCNC: 24 MMOL/L (ref 21–32)
CREAT SERPL-MCNC: 1.88 MG/DL (ref 0.7–1.3)
CREAT UR-MCNC: 263 MG/DL
EST. AVERAGE GLUCOSE BLD GHB EST-MCNC: 126 MG/DL
GLUCOSE SERPL-MCNC: 101 MG/DL (ref 65–100)
HBA1C MFR BLD: 6 % (ref 4–5.6)
MICROALBUMIN UR-MCNC: 13.5 MG/DL
MICROALBUMIN/CREAT UR-RTO: 51 MG/G (ref 0–30)
POTASSIUM SERPL-SCNC: 4.2 MMOL/L (ref 3.5–5.1)
SODIUM SERPL-SCNC: 137 MMOL/L (ref 136–145)

## 2025-07-24 PROCEDURE — 3078F DIAST BP <80 MM HG: CPT | Performed by: NURSE PRACTITIONER

## 2025-07-24 PROCEDURE — 99214 OFFICE O/P EST MOD 30 MIN: CPT | Performed by: NURSE PRACTITIONER

## 2025-07-24 PROCEDURE — 3074F SYST BP LT 130 MM HG: CPT | Performed by: NURSE PRACTITIONER

## 2025-07-24 RX ORDER — HYDROCHLOROTHIAZIDE 12.5 MG/1
CAPSULE ORAL
Qty: 6 EACH | Refills: 3 | Status: SHIPPED | OUTPATIENT
Start: 2025-07-24

## 2025-07-24 RX ORDER — LANCETS 28 GAUGE
1 EACH MISCELLANEOUS DAILY
Qty: 100 EACH | Refills: 3 | Status: SHIPPED | OUTPATIENT
Start: 2025-07-24

## 2025-07-24 RX ORDER — ACYCLOVIR 800 MG/1
TABLET ORAL
Qty: 6 EACH | Refills: 3 | Status: SHIPPED | OUTPATIENT
Start: 2025-07-24

## 2025-07-24 RX ORDER — FLASH GLUCOSE SENSOR
KIT MISCELLANEOUS
Qty: 1 EACH | Refills: 0 | Status: SHIPPED | OUTPATIENT
Start: 2025-07-24

## 2025-07-24 NOTE — PATIENT INSTRUCTIONS
Your weight goal that YOU SET today is 275 or less by your next visit.     So keep up with the exercise and be sure to eat a HIGH PROTEIN, HIGH FIBER diet.

## 2025-07-24 NOTE — PROGRESS NOTES
Pt is here for   Chief Complaint   Patient presents with    Medication Refill      HTN, DM, CHOL, DIMITRI, BMI.     Have you been to the ER, urgent care clinic since your last visit?  Hospitalized since your last visit?   NO    Have you seen or consulted any other health care providers outside our system since your last visit?   NO      “Have you had a colorectal cancer screening such as a colonoscopy/FIT/Cologuard?    NO    No colonoscopy on file  No cologuard on file  No FIT/FOBT on file   No flexible sigmoidoscopy on file     “Have you had a diabetic eye exam?”    NO     No diabetic eye exam on file           
cough, hemoptysis, dyspnea, and wheezing  CV:   negative for chest pain, palpitations, and lower extremity edema  GI:   negative for nausea, vomiting, diarrhea, abdominal pain, and melena  Endo:               negative for polyuria,polydipsia,polyphagia, and heat intolerance  Genitourinary: negative for frequency, urgency, dysuria, retention, and hematuria  Integument:  negative for rash, ulcerations, and pruritus  Hematologic:  negative for easy bruising and bleeding  Musculoskel: negative for arthralgias, muscle weakness,and joint pain/swelling  Neurological:  negative for headaches, dizziness, vertigo,and memory/gait problems  Behavl/Psych: negative for feelings of anxiety, depression, mood changes, and suicidal ideation    Past Medical History:   Diagnosis Date    Diabetes mellitus (HCC)     Fracture 09/29/2024    Fracture of sternum/upper rib and lower rib    HTN (hypertension)     Hypokalemia        Past Surgical History:   Procedure Laterality Date    PRE-MALIGNANT / BENIGN SKIN LESION EXCISION      cyst removal on scalp       Current Outpatient Medications   Medication Sig    Continuous Glucose  (FREESTYLE LAMAR READER) LELA Use to monitor blood glucose levels    FreeStyle Lancets MISC 1 each by Does not apply route daily    Continuous Glucose Sensor (FREESTYLE LAMAR 3 SENSOR) MISC Use to monitor blood glucose level    Continuous Glucose Sensor (FREESTYLE LAMAR 3 PLUS SENSOR) MISC Use to check blood glucose level throughout the day, change every 15 days.    spironolactone (ALDACTONE) 25 MG tablet TAKE 1 TABLET BY MOUTH EVERY DAY    ezetimibe (ZETIA) 10 MG tablet TAKE 1 TABLET BY MOUTH DAILY TAKE WITH YOUR STATIN    amLODIPine (NORVASC) 10 MG tablet TAKE 1 TABLET BY MOUTH EVERY DAY    Finerenone (KERENDIA) 20 MG TABS Take 20 mg by mouth daily    Alcohol Swabs (ALCOHOL PREP) PADS Use to cleanse skin before testing blood glucose daily.    blood glucose monitor strips Test 2 times a day. May substitute

## 2025-09-02 ENCOUNTER — PATIENT MESSAGE (OUTPATIENT)
Age: 60
End: 2025-09-02

## 2025-09-02 ENCOUNTER — CLINICAL DOCUMENTATION (OUTPATIENT)
Age: 60
End: 2025-09-02